# Patient Record
Sex: FEMALE | Race: WHITE | NOT HISPANIC OR LATINO | Employment: OTHER | URBAN - NONMETROPOLITAN AREA
[De-identification: names, ages, dates, MRNs, and addresses within clinical notes are randomized per-mention and may not be internally consistent; named-entity substitution may affect disease eponyms.]

---

## 2017-01-05 ENCOUNTER — CONSULT (OUTPATIENT)
Dept: CARDIOLOGY | Facility: CLINIC | Age: 32
End: 2017-01-05

## 2017-01-05 VITALS
HEIGHT: 62 IN | BODY MASS INDEX: 37.54 KG/M2 | DIASTOLIC BLOOD PRESSURE: 68 MMHG | HEART RATE: 76 BPM | SYSTOLIC BLOOD PRESSURE: 104 MMHG | WEIGHT: 204 LBS

## 2017-01-05 DIAGNOSIS — E56.9 VITAMIN DEFICIENCY: ICD-10-CM

## 2017-01-05 DIAGNOSIS — E78.00 HYPERCHOLESTEREMIA: ICD-10-CM

## 2017-01-05 DIAGNOSIS — R00.2 PALPITATIONS: ICD-10-CM

## 2017-01-05 DIAGNOSIS — R01.1 MURMUR, CARDIAC: ICD-10-CM

## 2017-01-05 DIAGNOSIS — R53.82 CHRONIC FATIGUE: ICD-10-CM

## 2017-01-05 DIAGNOSIS — R60.0 BILATERAL EDEMA OF LOWER EXTREMITY: ICD-10-CM

## 2017-01-05 DIAGNOSIS — R06.02 SHORTNESS OF BREATH: ICD-10-CM

## 2017-01-05 DIAGNOSIS — R07.89 OTHER CHEST PAIN: Primary | ICD-10-CM

## 2017-01-05 PROCEDURE — 99204 OFFICE O/P NEW MOD 45 MIN: CPT | Performed by: INTERNAL MEDICINE

## 2017-01-05 RX ORDER — FLUDROCORTISONE ACETATE 0.1 MG/1
0.1 TABLET ORAL DAILY
Qty: 30 TABLET | Refills: 8 | Status: SHIPPED | OUTPATIENT
Start: 2017-01-05 | End: 2017-07-25

## 2017-01-05 RX ORDER — ONDANSETRON 4 MG/1
4 TABLET, FILM COATED ORAL EVERY 8 HOURS PRN
COMMUNITY

## 2017-01-05 NOTE — PROGRESS NOTES
CARDIAC COMPLAINTS  chest pressure/discomfort, dyspnea and palpitations      Subjective   Kendra Freitas is a 31 y.o. female came in today for her initial cardiac evaluation.  She has history of palpitation on and off for many years.  She is seen by another cardiologist and also an electrophysiologist.  Apparently the tried multiple different medications, which she did not take regularly.  She did develop some side effects from the medications.  She now wants to be seen here.  The palpitation occurs mostly at rest, but sometime on exertion.  She also gets chest pain which sometime associated with the palpitation that most of the time without it.  She also has been having increasing shortness of breath on minimal exertion.  She also has bilateral leg edema which is progressively got worse.  She apparently underwent some investigation about a year or 2 ago and was told everything was normal.  She is not sure when was the last time she had any lab work done.    History reviewed. No pertinent past surgical history.    Current Outpatient Prescriptions   Medication Sig Dispense Refill   • clonazePAM (KlonoPIN) 0.5 MG tablet Take 0.5 mg by mouth 2 (Two) Times a Day As Needed for seizures.     • mometasone-formoterol (DULERA 200) 200-5 MCG/ACT inhaler Inhale 2 puffs 2 (Two) Times a Day.     • ondansetron (ZOFRAN) 4 MG tablet Take 4 mg by mouth Every 8 (Eight) Hours As Needed for nausea or vomiting.     • risperiDONE (RisperDAL) 1 MG tablet Take 1 mg by mouth Daily.     • fludrocortisone 0.1 MG tablet Take 1 tablet by mouth Daily. 30 tablet 8   • metoprolol tartrate (LOPRESSOR) 25 MG tablet Take 1 tablet by mouth 2 (Two) Times a Day. 60 tablet 8     No current facility-administered medications for this visit.            ALLERGIES:  Amoxicillin; Contrast dye; Ibuprofen; Phenergan [promethazine hcl]; and Prenatal complete    Past Medical History   Diagnosis Date   • Asthma    • H/O  section    • History of  "appendectomy    • History of ear surgery      right   • Seizure      Pt states only related to taking ibuprofen       History   Smoking Status   • Former Smoker   • Quit date: 2008   Smokeless Tobacco   • Current User   • Types: Snuff        Review of Systems   Constitutional: Positive for fatigue and unexpected weight change. Negative for activity change.   HENT: Negative for congestion and sneezing.    Eyes: Negative for discharge.   Respiratory: Positive for chest tightness and shortness of breath.    Cardiovascular: Positive for chest pain, palpitations and leg swelling.   Gastrointestinal: Negative for abdominal distention and nausea.   Endocrine: Negative for cold intolerance.   Genitourinary: Negative for difficulty urinating.   Musculoskeletal: Positive for arthralgias, back pain and myalgias.   Neurological: Positive for dizziness and numbness.   Hematological: Negative for adenopathy.   Psychiatric/Behavioral: Negative for agitation.       Diabetes- No  Thyroid- normal    Objective     Visit Vitals   • /68 (BP Location: Left arm)   • Pulse 76  Comment: regular   • Ht 62\" (157.5 cm)   • Wt 204 lb (92.5 kg)   • BMI 37.31 kg/m2       Physical Exam   Constitutional: She appears well-developed.   HENT:   Head: Normocephalic.   Eyes: Pupils are equal, round, and reactive to light.   Neck: Normal range of motion.   Cardiovascular: Normal rate and regular rhythm.    Murmur heard.  Pulmonary/Chest: Effort normal.   Abdominal: Soft.   Musculoskeletal: Normal range of motion.   Neurological: She is alert.   Skin: Skin is warm.   Psychiatric: She has a normal mood and affect.       Procedures      Assessment/Plan   At baseline her heart rate and blood pressure is stable.  Her BMI is elevated at 37.  She does have bilateral edema, appears to be more lymphatic then vascular.  I advised her to have lab work done to check a BNP, electrolytes, renal function, lipids and TSH.  I scheduled her to undergo an " echocardiogram to evaluate the LV function, valvular structures.  I also scheduled her to undergo a stress test to rule out ischemia causing the chest pain.  Since she is not able to walk, it is to be done as a Lexiscan.  Her EKG which was done about a month ago, did not show any acute changes.  At this time I started her on low-dose of beta blockers, and also 0.1 mg of Florinef once a day.  Based on the results of the tests, and her response to her medications further recommendations will be made.  Kendra was seen today for establish care, medication, palpitations and records.    Diagnoses and all orders for this visit:    Other chest pain  -     CBC & Differential; Future  -     High Sensitivity CRP; Future  -     Stress Test With Myocardial Perfusion One Day; Future    Shortness of breath  -     Comprehensive Metabolic Panel; Future  -     Adult Transthoracic Echo Complete; Future    Palpitations  -     TSH; Future    Bilateral edema of lower extremity  -     proBNP; Future    Chronic fatigue    Murmur, cardiac  -     Adult Transthoracic Echo Complete; Future    Vitamin deficiency  -     Vitamin B12 & Folate; Future  -     Vitamin D 1,25 Dihydroxy; Future    Hypercholesteremia  -     Lipid Panel; Future  -     Stress Test With Myocardial Perfusion One Day; Future    Other orders  -     fludrocortisone 0.1 MG tablet; Take 1 tablet by mouth Daily.  -     metoprolol tartrate (LOPRESSOR) 25 MG tablet; Take 1 tablet by mouth 2 (Two) Times a Day.                    Electronically signed by Mino Barrett MD January 5, 2017 1:44 PM

## 2017-01-05 NOTE — MR AVS SNAPSHOT
[facility] CARDIOLOGY  [phone]                    Kendra Freitas   1/5/2017 10:45 AM   Consult    Dept Phone:  [phone]   Encounter #:  [id number]    Provider:  Mino Barrett MD   Department:  [facility] CARDIOLOGY                Your Full Care Plan              Today's Medication Changes          These changes are accurate as of: 1/5/17 11:36 AM.  If you have any questions, ask your nurse or doctor.               New Medication(s)Ordered:     fludrocortisone 0.1 MG tablet   Take 1 tablet by mouth Daily.   Started by:  Mino Barrett MD       metoprolol tartrate 25 MG tablet   Commonly known as:  LOPRESSOR   Take 1 tablet by mouth 2 (Two) Times a Day.   Started by:  Mino Barrett MD            Where to Get Your Medications      These medications were sent to [email or web address] [place], [place] - [address] [facility] [phone] [facility] [phone] [address] [address]     Phone:  [phone]     fludrocortisone 0.1 MG tablet    metoprolol tartrate 25 MG tablet                  Your Updated Medication List          This list is accurate as of: 1/5/17 11:36 AM.  Always use your most recent med list.                clonazePAM 0.5 MG tablet   Commonly known as:  KlonoPIN       fludrocortisone 0.1 MG tablet   Take 1 tablet by mouth Daily.       metoprolol tartrate 25 MG tablet   Commonly known as:  LOPRESSOR   Take 1 tablet by mouth 2 (Two) Times a Day.       mometasone-formoterol 200-5 MCG/ACT inhaler   Commonly known as:  DULERA 200       ondansetron 4 MG tablet   Commonly known as:  ZOFRAN       risperiDONE 1 MG tablet   Commonly known as:  risperDAL               You Were Diagnosed With        Codes Comments    Other chest pain    -  Primary ICD-10-CM: R07.89  ICD-9-CM: 786.59     Shortness of breath     ICD-10-CM: R06.02  ICD-9-CM: 786.05     Palpitations     ICD-10-CM: R00.2  ICD-9-CM: 785.1     Bilateral edema of lower extremity      ICD-10-CM: R60.0  ICD-9-CM: 782.3     Chronic fatigue     ICD-10-CM: R53.82  ICD-9-CM: 780.79     Murmur, cardiac     ICD-10-CM: R01.1  ICD-9-CM: 785.2     Vitamin deficiency     ICD-10-CM: E56.9  ICD-9-CM: 269.2     Hypercholesteremia     ICD-10-CM: E78.00  ICD-9-CM: 272.0       Instructions     None    Patient Instructions History      Upcoming Appointments     Visit Type Date Time Department    CONSULT 2017 10:45 AM [facility]    FOLLOW UP 2017  9:00 AM [facility]      [employer] Signup     [facility] [employer] allows you to send messages to your doctor, view your test results, renew your prescriptions, schedule appointments, and more. To sign up, go to [employer] and click on the Sign Up Now link in the New User? box. Enter your [employer] Activation Code exactly as it appears below along with the last four digits of your Social Security Number and your Date of Birth () to complete the sign-up process. If you do not sign up before the expiration date, you must request a new code.    [employer] Activation Code: [email or web address]  Expires: 2017 11:34 AM    If you have questions, you can email [email or web address] or call [phone] to talk to our [employer] staff. Remember, [employer] is NOT to be used for urgent needs. For medical emergencies, dial 911.               Other Info from Your Visit           Your Appointments     2017  9:00 AM EDT   Follow Up with NALINI Andersen   [facility] (--)    [address] [place] [address] [phone]           Arrive 15 minutes prior to appointment.              Allergies     Amoxicillin      Contrast Dye      Ibuprofen      Phenergan [Promethazine Hcl]      Prenatal Complete        Reason for Visit     Establish Care  Transferring care from Dr Tate.( last saw him 2 months ago) History of palpitations, edema in lower extremities.  Still having problems.  Stress and Echo about  "a year ago at Dr Tate's office.     Medication Pt states she has been on numerous meds before but stopped all meds but the 3 listed     Palpitations Was referred to Dr El in Oct for palpitations. His note is in here with EKG.     Records Extended moniter report and strips under media tab, ref. records.       Vital Signs     Blood Pressure Pulse Height Weight Body Mass Index Smoking Status    104/68 (BP Location: Left arm) 76 62\" (157.5 cm) 204 lb (92.5 kg) 37.31 kg/m2 Former Smoker      Problems and Diagnoses Noted     Tiredness    Chest pain    -  Primary    Shortness of breath        Palpitations        Edema of both legs        Murmur, cardiac        Vitamin deficiency        High cholesterol            "

## 2017-01-05 NOTE — LETTER
January 5, 2017     Jamia Samaniego MD  [address]  [facility] [address]  [facility] [address]    Patient: Kendra Freitas   YOB: 1985   Date of Visit: 1/5/2017       Dear Dr. Aden MD:    Thank you for referring Kendra Freitas to me for evaluation. Below are the relevant portions of my assessment and plan of care.       Subjective   Kendra Freitas is a 31 y.o. female came in today for her initial cardiac evaluation.  She has history of palpitation on and off for many years.  She is seen by another cardiologist and also an electrophysiologist.  Apparently the tried multiple different medications, which she did not take regularly.  She did develop some side effects from the medications.  She now wants to be seen here.  The palpitation occurs mostly at rest, but sometime on exertion.  She also gets chest pain which sometime associated with the palpitation that most of the time without it.  She also has been having increasing shortness of breath on minimal exertion.  She also has bilateral leg edema which is progressively got worse.  She apparently underwent some investigation about a year or 2 ago and was told everything was normal.  She is not sure when was the last time she had any lab work done.    History reviewed. No pertinent past surgical history.    Current Outpatient Prescriptions   Medication Sig Dispense Refill   • clonazePAM (KlonoPIN) 0.5 MG tablet Take 0.5 mg by mouth 2 (Two) Times a Day As Needed for seizures.     • mometasone-formoterol (DULERA 200) 200-5 MCG/ACT inhaler Inhale 2 puffs 2 (Two) Times a Day.     • ondansetron (ZOFRAN) 4 MG tablet Take 4 mg by mouth Every 8 (Eight) Hours As Needed for nausea or vomiting.     • risperiDONE (RisperDAL) 1 MG tablet Take 1 mg by mouth Daily.     • fludrocortisone 0.1 MG tablet Take 1 tablet by mouth Daily. 30 tablet 8   • metoprolol tartrate (LOPRESSOR) 25 MG tablet Take 1 tablet by mouth 2 (Two) Times a Day. 60 tablet 8     No current  facility-administered medications for this visit.                Assessment/Plan   At baseline her heart rate and blood pressure is stable.  Her BMI is elevated at 37.  She does have bilateral edema, appears to be more lymphatic then vascular.  I advised her to have lab work done to check a BNP, electrolytes, renal function, lipids and TSH.  I scheduled her to undergo an echocardiogram to evaluate the LV function, valvular structures.  I also scheduled her to undergo a stress test to rule out ischemia causing the chest pain.  Since she is not able to walk, it is to be done as a Lexiscan.  Her EKG which was done about a month ago, did not show any acute changes.  At this time I started her on low-dose of beta blockers, and also 0.1 mg of Florinef once a day.  Based on the results of the tests, and her response to her medications further recommendations will be made.  Kendra was seen today for establish care, medication, palpitations and records.    Diagnoses and all orders for this visit:    Other chest pain  -     CBC & Differential; Future  -     High Sensitivity CRP; Future  -     Stress Test With Myocardial Perfusion One Day; Future    Shortness of breath  -     Comprehensive Metabolic Panel; Future  -     Adult Transthoracic Echo Complete; Future    Palpitations  -     TSH; Future    Bilateral edema of lower extremity  -     proBNP; Future    Chronic fatigue    Murmur, cardiac  -     Adult Transthoracic Echo Complete; Future    Vitamin deficiency  -     Vitamin B12 & Folate; Future  -     Vitamin D 1,25 Dihydroxy; Future    Hypercholesteremia  -     Lipid Panel; Future  -     Stress Test With Myocardial Perfusion One Day; Future    Other orders  -     fludrocortisone 0.1 MG tablet; Take 1 tablet by mouth Daily.  -     metoprolol tartrate (LOPRESSOR) 25 MG tablet; Take 1 tablet by mouth 2 (Two) Times a Day.               If you have questions, please do not hesitate to call me. I look forward to following Kendra  along with you.         Sincerely,        Mino Barrett MD        CC: No Recipients

## 2017-01-12 ENCOUNTER — OUTSIDE FACILITY SERVICE (OUTPATIENT)
Dept: CARDIOLOGY | Facility: CLINIC | Age: 32
End: 2017-01-12

## 2017-01-12 ENCOUNTER — HOSPITAL ENCOUNTER (OUTPATIENT)
Dept: CARDIOLOGY | Facility: HOSPITAL | Age: 32
Discharge: HOME | End: 2017-01-12
Attending: INTERNAL MEDICINE

## 2017-01-12 DIAGNOSIS — E78.00 HYPERCHOLESTEREMIA: ICD-10-CM

## 2017-01-12 DIAGNOSIS — R06.02 SHORTNESS OF BREATH: ICD-10-CM

## 2017-01-12 DIAGNOSIS — R07.89 OTHER CHEST PAIN: ICD-10-CM

## 2017-01-12 DIAGNOSIS — R01.1 MURMUR, CARDIAC: ICD-10-CM

## 2017-01-12 LAB
MAXIMAL PREDICTED HEART RATE: 189 BPM
STRESS TARGET HR: 161 BPM

## 2017-01-12 PROCEDURE — 78452 HT MUSCLE IMAGE SPECT MULT: CPT | Performed by: INTERNAL MEDICINE

## 2017-01-12 PROCEDURE — 25010000002 REGADENOSON 0.4 MG/5ML SOLUTION: Performed by: INTERNAL MEDICINE

## 2017-01-12 PROCEDURE — 93017 CV STRESS TEST TRACING ONLY: CPT

## 2017-01-12 PROCEDURE — 0 TECHNETIUM SESTAMIBI: Performed by: INTERNAL MEDICINE

## 2017-01-12 PROCEDURE — 93306 TTE W/DOPPLER COMPLETE: CPT

## 2017-01-12 PROCEDURE — 93306 TTE W/DOPPLER COMPLETE: CPT | Performed by: INTERNAL MEDICINE

## 2017-01-12 PROCEDURE — 93018 CV STRESS TEST I&R ONLY: CPT | Performed by: INTERNAL MEDICINE

## 2017-01-12 PROCEDURE — 78452 HT MUSCLE IMAGE SPECT MULT: CPT

## 2017-01-12 PROCEDURE — A9500 TC99M SESTAMIBI: HCPCS | Performed by: INTERNAL MEDICINE

## 2017-01-12 RX ADMIN — Medication 1 DOSE: at 09:30

## 2017-01-12 RX ADMIN — REGADENOSON 0.4 MG: 0.08 INJECTION, SOLUTION INTRAVENOUS at 09:30

## 2017-01-17 ENCOUNTER — TELEPHONE (OUTPATIENT)
Dept: CARDIOLOGY | Facility: CLINIC | Age: 32
End: 2017-01-17

## 2017-01-18 ENCOUNTER — TELEPHONE (OUTPATIENT)
Dept: CARDIOLOGY | Facility: CLINIC | Age: 32
End: 2017-01-18

## 2017-01-18 NOTE — TELEPHONE ENCOUNTER
Lab from [facility] called, no codes to pass Vitamin D, Vitamin B12 or  Folate. These tests were cancelled.  Do you want to reorder and add new diagnosis to cover?

## 2017-07-25 ENCOUNTER — OFFICE VISIT (OUTPATIENT)
Dept: CARDIOLOGY | Facility: CLINIC | Age: 32
End: 2017-07-25

## 2017-07-25 VITALS
WEIGHT: 200 LBS | HEIGHT: 62 IN | BODY MASS INDEX: 36.8 KG/M2 | SYSTOLIC BLOOD PRESSURE: 102 MMHG | HEART RATE: 96 BPM | DIASTOLIC BLOOD PRESSURE: 64 MMHG

## 2017-07-25 DIAGNOSIS — R00.0 SINUS TACHYCARDIA: ICD-10-CM

## 2017-07-25 DIAGNOSIS — R00.2 PALPITATIONS: Primary | ICD-10-CM

## 2017-07-25 DIAGNOSIS — F12.90 MARIJUANA SMOKER: ICD-10-CM

## 2017-07-25 DIAGNOSIS — F17.298 OTHER TOBACCO PRODUCT NICOTINE DEPENDENCE WITH OTHER NICOTINE-INDUCED DISORDER: ICD-10-CM

## 2017-07-25 PROCEDURE — 99213 OFFICE O/P EST LOW 20 MIN: CPT | Performed by: NURSE PRACTITIONER

## 2017-07-25 RX ORDER — FUROSEMIDE 20 MG/1
20 TABLET ORAL DAILY
COMMUNITY
End: 2019-02-14 | Stop reason: DRUGHIGH

## 2017-07-25 RX ORDER — ASPIRIN 81 MG/1
81 TABLET ORAL DAILY
COMMUNITY
End: 2021-03-24 | Stop reason: SDUPTHER

## 2017-07-25 NOTE — PROGRESS NOTES
"Chief Complaint   Patient presents with   • Follow-up     PCP refills meds. Can't remember last time labs were checked.    • Chest Pain     Off and on. Says it is about the same as before.    • Shortness of Breath     About the same as before.    • Palpitations     About the same as before.        Orville Freitas is a 32 y.o. female with a history of palpitation on and off for many years.  She has been seen by another cardiologist and also an electrophysiologist.  Apparently multiple medications were tried, which she admits she did not take regularly due to some kind of side effect.  IN January 2017, she was referred to Dr. Barrett for evaluation of her palpitations. At her consultation Lopressor and fludrocortisone were recommended. She then underwent stress and echo that were negative for ischemia or arrhythmia.    Today she comes to the office for a follow up visit and continues to have same symptoms. She states she was not able to take Lopressor or fludrocortisone due to making her feel bad. She states if she smokes \"a little weed\" at night, it helps her palpitations more than anything.     HPI         Cardiac History:    Past Surgical History:   Procedure Laterality Date   • CARDIOVASCULAR STRESS TEST  01/12/2017    Lexiscan- no evidence of ischemia   • ECHO - CONVERTED  01/12/2017    EF 50-55%, mild MR, RVSP 23 mmHg       Current Outpatient Prescriptions   Medication Sig Dispense Refill   • aspirin 81 MG EC tablet Take 81 mg by mouth Daily.     • Fluticasone Furoate (ARNUITY ELLIPTA IN) Inhale.     • furosemide (LASIX) 20 MG tablet Take 20 mg by mouth Daily.     • Indacaterol-Glycopyrrolate (UTIBRON NEOHALER IN) Inhale.     • ondansetron (ZOFRAN) 4 MG tablet Take 4 mg by mouth Every 8 (Eight) Hours As Needed for nausea or vomiting.     • rotigotine (NEUPRO) 1 MG/24HR patch 24 hour 24 hour patch Place 1 patch on the skin Daily.       No current facility-administered medications for this visit.  "       Amoxicillin; Contrast dye; Ibuprofen; Phenergan [promethazine hcl]; and Prenatal complete    Past Medical History:   Diagnosis Date   • Asthma    • H/O  section    • History of appendectomy    • History of ear surgery     right   • Seizure     Pt states only related to taking ibuprofen       Social History     Social History   • Marital status: Unknown     Spouse name: N/A   • Number of children: N/A   • Years of education: N/A     Occupational History   • Not on file.     Social History Main Topics   • Smoking status: Former Smoker     Quit date:    • Smokeless tobacco: Current User     Types: Snuff   • Alcohol use No   • Drug use: No   • Sexual activity: Defer     Other Topics Concern   • Not on file     Social History Narrative       Family History   Problem Relation Age of Onset   • Heart disease Father    • Hypertension Father    • COPD Father    • Diabetes Father    • Alzheimer's disease Father        Review of Systems   Constitution: Positive for malaise/fatigue. Negative for decreased appetite.   HENT: Negative for congestion and sore throat.    Eyes: Negative for blurred vision.   Cardiovascular: Positive for chest pain, leg swelling (reports) and palpitations.   Respiratory: Positive for shortness of breath. Negative for sleep disturbances due to breathing and snoring.    Endocrine: Negative for cold intolerance and heat intolerance.   Hematologic/Lymphatic: Negative for adenopathy. Does not bruise/bleed easily.   Skin: Negative for itching, nail changes and skin cancer.   Musculoskeletal: Positive for joint pain (knees). Negative for arthritis and myalgias.   Gastrointestinal: Positive for heartburn (at times). Negative for abdominal pain and dysphagia.   Genitourinary: Negative for dysuria and frequency.   Neurological: Negative for dizziness, light-headedness, seizures and vertigo.   Psychiatric/Behavioral: Negative for altered mental status. The patient is nervous/anxious.   "  Allergic/Immunologic: Negative for environmental allergies and hives.        Diabetes- No  Thyroid-normal    Objective     /64  Pulse 96  Ht 62\" (157.5 cm)  Wt 200 lb (90.7 kg)  BMI 36.58 kg/m2    Physical Exam   Constitutional: She is oriented to person, place, and time. She appears well-nourished.   HENT:   Head: Normocephalic.   Eyes: Pupils are equal, round, and reactive to light.   Neck: Normal range of motion. Carotid bruit is not present. No edema present.   Cardiovascular: Normal rate, regular rhythm, S1 normal and S2 normal.    No murmur heard.  Pulses:       Radial pulses are 2+ on the right side, and 2+ on the left side.   Pulmonary/Chest: Breath sounds normal.   Abdominal: Soft. Bowel sounds are normal.   Musculoskeletal: Normal range of motion.   Neurological: She is alert and oriented to person, place, and time.   Skin: Skin is warm.   Psychiatric: She has a normal mood and affect.      Procedures        Assessment/Plan      Kendra was seen today for follow-up, chest pain, shortness of breath and palpitations.    Diagnoses and all orders for this visit:    Palpitations    Sinus tachycardia    Other tobacco product nicotine dependence with other nicotine-induced disorder    Marijuana smoker      The reports of her most recent stress and echocardiogram were reviewed. No ischemia or arrhythmia noted. Her blood pressure and heart rate are stable. I advised her to avoid caffeine. I did not make any medication changes today given patient has not been able to tolerate prescription medications recommended. She wants to continue conservative management and asked if smoking marijuana could help her heart. I advised her it is not used to treat cardiac disease and I recommend she avoid due to lung issues for which she follows with pulmonologist. I also encouraged her to avoid using tobacco in form of dip. No further cardiac testing ordered at this time. A 6 month follow up scheduled.        "     Electronically signed by NALINI Glass,  July 27, 2017 4:52 PM

## 2018-01-29 ENCOUNTER — OFFICE VISIT (OUTPATIENT)
Dept: CARDIOLOGY | Facility: CLINIC | Age: 33
End: 2018-01-29

## 2018-01-29 VITALS
HEIGHT: 62 IN | HEART RATE: 80 BPM | DIASTOLIC BLOOD PRESSURE: 72 MMHG | WEIGHT: 195 LBS | SYSTOLIC BLOOD PRESSURE: 110 MMHG | BODY MASS INDEX: 35.88 KG/M2

## 2018-01-29 DIAGNOSIS — I38 VHD (VALVULAR HEART DISEASE): ICD-10-CM

## 2018-01-29 DIAGNOSIS — R06.02 SHORTNESS OF BREATH: ICD-10-CM

## 2018-01-29 DIAGNOSIS — J45.20 MILD INTERMITTENT ASTHMA WITHOUT COMPLICATION: ICD-10-CM

## 2018-01-29 DIAGNOSIS — R00.0 SINUS TACHYCARDIA: Primary | ICD-10-CM

## 2018-01-29 DIAGNOSIS — Z72.0 SNUFF USER: ICD-10-CM

## 2018-01-29 DIAGNOSIS — R00.2 PALPITATIONS: ICD-10-CM

## 2018-01-29 DIAGNOSIS — F41.9 ANXIETY: ICD-10-CM

## 2018-01-29 DIAGNOSIS — R60.0 LOCALIZED EDEMA: ICD-10-CM

## 2018-01-29 PROCEDURE — 99213 OFFICE O/P EST LOW 20 MIN: CPT | Performed by: NURSE PRACTITIONER

## 2018-01-29 NOTE — PROGRESS NOTES
Chief Complaint   Patient presents with   • Follow-up     For cardiac management. Reports having a sharp chest pain earlier today. Reports having shortness of breath, relates to asthma, reports it has got worse. Reports she has palpitations occasionally. Last lab work done on 01/18/17 per PCP, in chart. Reports that PCP lasix is now 40 mg QD.    • Med Refill     PCP does medication refills. Did not bring medication list, went over verbally.        Cardiac Complaints  palpitations      Subjective   Kendra Freitas is a 32 y.o. female with a history of palpitations on and off for many years.  She has been seen by another cardiologist and also an electrophysiologist.  Apparently multiple medications were tried, which she admits she did not take regularly due to some kind of side effect.  In January 2017, she was referred to Dr. Barrett for evaluation of her palpitations. At her consultation Lopressor and fludrocortisone were recommended. She then underwent stress and echo that were negative for ischemia or arrhythmia. She later stopped taking both medications due to side effects but continued to use marijuana routinely to control her palpitations.  She returns today for follow up and continues to have sharp chest pain and shortness of breath which she attributes to her lung problems. She continues to have palpitations but no worse than prior. She is seeing an allergist in regards but reports she has not been able to get in with her medical card. Labs are done with PCP, no recent copy is available from me.      Cardiac History  Past Surgical History:   Procedure Laterality Date   • CARDIOVASCULAR STRESS TEST  01/12/2017    Lexiscan- no evidence of ischemia   • ECHO - CONVERTED  01/12/2017    EF 50-55%, mild MR, RVSP 23 mmHg       Current Outpatient Prescriptions   Medication Sig Dispense Refill   • aspirin 81 MG EC tablet Take 81 mg by mouth Daily.     • Fluticasone Furoate (ARNUITY ELLIPTA IN) Inhale.     • ondansetron  (ZOFRAN) 4 MG tablet Take 4 mg by mouth Every 8 (Eight) Hours As Needed for nausea or vomiting.     • rotigotine (NEUPRO) 1 MG/24HR patch 24 hour 24 hour patch Place 1 patch on the skin Daily.     • furosemide (LASIX) 20 MG tablet Take 20 mg by mouth Daily.       No current facility-administered medications for this visit.        Amoxicillin; Contrast dye; Garlic; Ibuprofen; Phenergan [promethazine hcl]; and Prenatal complete    Past Medical History:   Diagnosis Date   • Asthma    • H/O  section    • History of appendectomy    • History of ear surgery     right   • Seizure     Pt states only related to taking ibuprofen       Social History     Social History   • Marital status: Unknown     Spouse name: N/A   • Number of children: N/A   • Years of education: N/A     Occupational History   • Not on file.     Social History Main Topics   • Smoking status: Former Smoker     Quit date:    • Smokeless tobacco: Current User     Types: Snuff   • Alcohol use No   • Drug use: No   • Sexual activity: Defer     Other Topics Concern   • Not on file     Social History Narrative       Family History   Problem Relation Age of Onset   • Heart disease Father    • Hypertension Father    • COPD Father    • Diabetes Father    • Alzheimer's disease Father        Review of Systems   Constitution: Negative for weakness and malaise/fatigue.   Cardiovascular: Positive for palpitations. Negative for chest pain, dyspnea on exertion, irregular heartbeat, near-syncope and syncope.   Respiratory: Positive for shortness of breath. Negative for wheezing.    Musculoskeletal: Negative for back pain and joint pain.   Gastrointestinal: Negative for anorexia, heartburn and nausea.   Genitourinary: Negative for dysuria, hesitancy and nocturia.   Neurological: Negative for dizziness, light-headedness and loss of balance.   Psychiatric/Behavioral: Negative for depression and memory loss.       DiabetesNo  Thyroidnormal    Objective     BP  "110/72 (BP Location: Left arm)  Pulse 80  Ht 157.5 cm (62.01\")  Wt 88.5 kg (195 lb)  BMI 35.66 kg/m2    Physical Exam   Constitutional: She is oriented to person, place, and time. She appears well-developed and well-nourished.   HENT:   Head: Normocephalic and atraumatic.   Eyes: EOM are normal. Pupils are equal, round, and reactive to light.   Neck: Normal range of motion. Neck supple.   Cardiovascular: Normal rate and regular rhythm.    Murmur heard.  Pulmonary/Chest: Effort normal and breath sounds normal.   Abdominal: Soft.   Musculoskeletal: Normal range of motion.   Neurological: She is alert and oriented to person, place, and time.   Skin: Skin is warm and dry.   Psychiatric: She has a normal mood and affect. Her behavior is normal.       Procedures    Assessment/Plan     HR and BP are stable today.  No changes to current regimen will be advised.  No new cardiac workup will be encouraged today as no new concerns voiced.  Labs are done with your office, she states she has not had any for quite some time.  She was encouraged to get back in with your office for more labs since medication is filled with your office as well.  She is now on lasix therapy for edema in BLE, but reports she has not had potassium or creatinine checked.  She continues to report palpitations and shortness of breath but reports no worse than prior, and is no longer using her marijuana to aid in palpitation management.  She continues to follow with allergist for shortness of breath but has not been able to follow with them for sometime due to insurance coverage.  Unfortunately, she still continues to dip despite concerns.  We discussed tobacco cessation once again.  She reports also following with Dr. Maria for cardiac management and states she has an appointment with him next month.  Patient states seeing two specialist for almost every condition she has, which is not safest practice, as both specialist are trying to manage same " conditions.  FU will be extended to yearly as testing has been negative, cardiac status appears stable, and labs and refills are managed with your office.  If any worsening symptoms should develop, patient advised to call for sooner appointment.      Problems Addressed this Visit        Cardiovascular and Mediastinum    Sinus tachycardia - Primary    VHD (valvular heart disease)       Other    Anxiety      Other Visit Diagnoses     Palpitations        Mild intermittent asthma without complication        Localized edema        Snuff user        Shortness of breath                      Electronically signed by NALINI Erickson January 29, 2018 4:30 PM

## 2018-04-16 NOTE — TELEPHONE ENCOUNTER
Patient aware of stress test and echo results and recommendations.  I spoke with patient's father, Malachi Chaney.  Continue home medications.  Aware if palpitations to call the office per stress test can increase Lopressor to 50 mg BID if palpitations persist.   Myomas

## 2019-02-14 ENCOUNTER — OFFICE VISIT (OUTPATIENT)
Dept: CARDIOLOGY | Facility: CLINIC | Age: 34
End: 2019-02-14

## 2019-02-14 VITALS
BODY MASS INDEX: 37.17 KG/M2 | WEIGHT: 202 LBS | HEART RATE: 76 BPM | SYSTOLIC BLOOD PRESSURE: 110 MMHG | HEIGHT: 62 IN | DIASTOLIC BLOOD PRESSURE: 82 MMHG

## 2019-02-14 DIAGNOSIS — F19.11 HISTORY OF SUBSTANCE ABUSE (HCC): ICD-10-CM

## 2019-02-14 DIAGNOSIS — R07.89 CHEST PAIN, ATYPICAL: ICD-10-CM

## 2019-02-14 DIAGNOSIS — R00.2 PALPITATIONS: Primary | ICD-10-CM

## 2019-02-14 DIAGNOSIS — F17.298 OTHER TOBACCO PRODUCT NICOTINE DEPENDENCE WITH OTHER NICOTINE-INDUCED DISORDER: ICD-10-CM

## 2019-02-14 DIAGNOSIS — R60.0 LOCALIZED EDEMA: ICD-10-CM

## 2019-02-14 DIAGNOSIS — J45.20 MILD INTERMITTENT ASTHMA, UNSPECIFIED WHETHER COMPLICATED: ICD-10-CM

## 2019-02-14 DIAGNOSIS — R06.02 SHORTNESS OF BREATH: ICD-10-CM

## 2019-02-14 DIAGNOSIS — F41.9 ANXIETY: ICD-10-CM

## 2019-02-14 DIAGNOSIS — E66.9 OBESITY (BMI 30-39.9): ICD-10-CM

## 2019-02-14 PROCEDURE — 99213 OFFICE O/P EST LOW 20 MIN: CPT | Performed by: NURSE PRACTITIONER

## 2019-02-14 RX ORDER — CETIRIZINE HYDROCHLORIDE 10 MG/1
10 TABLET ORAL DAILY
COMMUNITY

## 2019-02-14 RX ORDER — BUMETANIDE 1 MG/1
1 TABLET ORAL DAILY
COMMUNITY
End: 2020-06-26 | Stop reason: SDUPTHER

## 2019-02-14 RX ORDER — ALBUTEROL SULFATE 90 UG/1
2 AEROSOL, METERED RESPIRATORY (INHALATION) EVERY 4 HOURS PRN
COMMUNITY
End: 2019-12-23 | Stop reason: ALTCHOICE

## 2019-02-14 RX ORDER — QUETIAPINE FUMARATE 50 MG/1
50 TABLET, FILM COATED ORAL NIGHTLY
COMMUNITY
End: 2019-12-23 | Stop reason: DRUGHIGH

## 2019-02-14 RX ORDER — FUROSEMIDE 40 MG/1
40 TABLET ORAL 2 TIMES DAILY
COMMUNITY
End: 2020-06-26 | Stop reason: SDUPTHER

## 2019-02-14 RX ORDER — NITROFURANTOIN MACROCRYSTALS 100 MG/1
100 CAPSULE ORAL 4 TIMES DAILY
COMMUNITY
End: 2019-05-30 | Stop reason: ALTCHOICE

## 2019-02-14 RX ORDER — MONTELUKAST SODIUM 10 MG/1
10 TABLET ORAL DAILY
COMMUNITY

## 2019-02-14 RX ORDER — OMEPRAZOLE 20 MG/1
20 CAPSULE, DELAYED RELEASE ORAL DAILY
COMMUNITY

## 2019-02-14 RX ORDER — LACTULOSE 10 G/15ML
20 SOLUTION ORAL DAILY
COMMUNITY
End: 2019-05-30 | Stop reason: ALTCHOICE

## 2019-02-14 RX ORDER — BUSPIRONE HYDROCHLORIDE 5 MG/1
5 TABLET ORAL 3 TIMES DAILY
COMMUNITY
End: 2019-05-30 | Stop reason: ALTCHOICE

## 2019-02-14 RX ORDER — CLONAZEPAM 0.5 MG/1
0.5 TABLET ORAL 2 TIMES DAILY
COMMUNITY
End: 2019-12-23 | Stop reason: ALTCHOICE

## 2019-02-14 RX ORDER — DULOXETIN HYDROCHLORIDE 60 MG/1
60 CAPSULE, DELAYED RELEASE ORAL DAILY
COMMUNITY

## 2019-02-14 RX ORDER — SPIRONOLACTONE 25 MG/1
25 TABLET ORAL DAILY
COMMUNITY
End: 2020-06-26 | Stop reason: SDUPTHER

## 2019-02-14 RX ORDER — PHENAZOPYRIDINE HYDROCHLORIDE 200 MG/1
200 TABLET, FILM COATED ORAL 3 TIMES DAILY
COMMUNITY
End: 2019-05-30 | Stop reason: ALTCHOICE

## 2019-02-14 RX ORDER — PREDNISONE 20 MG/1
20 TABLET ORAL DAILY
COMMUNITY
End: 2019-05-30 | Stop reason: ALTCHOICE

## 2019-02-14 NOTE — PROGRESS NOTES
"Chief Complaint   Patient presents with   • Follow-up     Cardiac management. Last labs per Dr Pinto. She stopped using Meth 3 months ago.   • Rapid Heart Rate     She states \"my heart is still racing, HR has been 123\".   • Chest Pain     Having some sharp pain to mid chest, short in duration.   • Shortness of Breath     She relates to COPD.   • Dizziness     Has with low B/P, she states \"B/P has been 94/66\".   • Edema     Still having swelling in legs and ankles.       Cardiac Complaints  chest pressure/discomfort, dyspnea, lower extremity edema and palpitations      Subjective   Kendra Freitas is a 33 y.o. female with palpitations she has had on and off for many years. She has been seen by another cardiologist and also an electrophysiologist.  Apparently multiple medications were tried, which she admits she did not take regularly due to some kind of side effect.  In January 2017, she was referred to Dr. Barrett for evaluation of her palpitations. At her consultation Lopressor and fludrocortisone were recommended. She then underwent stress and echo that were negative for ischemia or arrhythmia. She later stopped taking both medications due to side effects but continued to use marijuana routinely to control her palpitations. At last visit, she reported she had quit using marijuana for palpitation management. She does report COPD causes some issues with shortness of breath and sharp chest pain but admits it comes and goes quickly lasting only a few seconds to a few minutes and goes away on it's own.  She does have dizziness occasionally but only when her blood pressure is low.  She does have some edema in BLE but no worse than prior, she admits to taking several diuretics a day for management.  Labs are done with PCP.  She is unsure when checked last but admits Litzy Pinto did them.  She does have a long standing history of substance abuse and reports recently quitting meth around 3 months ago after a stent in " rehab.  No refills needed.            Cardiac History  Past Surgical History:   Procedure Laterality Date   • CARDIOVASCULAR STRESS TEST  2017    Lexiscan- no evidence of ischemia   • ECHO - CONVERTED  2017    EF 50-55%, mild MR, RVSP 23 mmHg       Current Outpatient Medications   Medication Sig Dispense Refill   • albuterol sulfate  (90 Base) MCG/ACT inhaler Inhale 2 puffs Every 4 (Four) Hours As Needed for Wheezing.     • aspirin 81 MG EC tablet Take 81 mg by mouth Daily.     • bumetanide (BUMEX) 1 MG tablet Take 1 mg by mouth Daily.     • busPIRone (BUSPAR) 5 MG tablet Take 5 mg by mouth 3 (Three) Times a Day.     • cetirizine (zyrTEC) 10 MG tablet Take 10 mg by mouth Daily.     • clonazePAM (KlonoPIN) 0.5 MG tablet Take 0.5 mg by mouth Daily.     • DULoxetine (CYMBALTA) 60 MG capsule Take 60 mg by mouth Daily.     • furosemide (LASIX) 40 MG tablet Take 40 mg by mouth 2 (Two) Times a Day.     • lactulose (CHRONULAC) 10 GM/15ML solution Take 20 g by mouth Daily.     • montelukast (SINGULAIR) 10 MG tablet Take 10 mg by mouth Daily.     • nitrofurantoin (MACRODANTIN) 100 MG capsule Take 100 mg by mouth 4 (Four) Times a Day.     • omeprazole (priLOSEC) 20 MG capsule Take 20 mg by mouth Daily.     • ondansetron (ZOFRAN) 4 MG tablet Take 4 mg by mouth Every 8 (Eight) Hours As Needed for nausea or vomiting.     • phenazopyridine (PYRIDIUM) 200 MG tablet Take 200 mg by mouth 3 (Three) Times a Day.     • predniSONE (DELTASONE) 20 MG tablet Take 20 mg by mouth Daily.     • QUEtiapine (SEROquel) 50 MG tablet Take 50 mg by mouth Every Night.     • spironolactone (ALDACTONE) 25 MG tablet Take 25 mg by mouth Daily.       No current facility-administered medications for this visit.        Contrast dye; Garlic; Ibuprofen; Phenergan [promethazine hcl]; Prenatal complete; and Amoxicillin    Past Medical History:   Diagnosis Date   • Asthma    • H/O  section    • History of appendectomy    • History of  ear surgery     right   • Lupus    • Seizure (CMS/HCC)     Pt states only related to taking ibuprofen       Social History     Socioeconomic History   • Marital status: Unknown     Spouse name: Not on file   • Number of children: Not on file   • Years of education: Not on file   • Highest education level: Not on file   Social Needs   • Financial resource strain: Not on file   • Food insecurity - worry: Not on file   • Food insecurity - inability: Not on file   • Transportation needs - medical: Not on file   • Transportation needs - non-medical: Not on file   Occupational History   • Not on file   Tobacco Use   • Smoking status: Former Smoker     Last attempt to quit:      Years since quittin.1   • Smokeless tobacco: Current User     Types: Snuff   Substance and Sexual Activity   • Alcohol use: Not on file   • Drug use: No   • Sexual activity: Defer   Other Topics Concern   • Not on file   Social History Narrative   • Not on file       Family History   Problem Relation Age of Onset   • Heart disease Father    • Hypertension Father    • COPD Father    • Diabetes Father    • Alzheimer's disease Father        Review of Systems   Constitution: Negative for weakness and malaise/fatigue.   Cardiovascular: Positive for chest pain, dyspnea on exertion, leg swelling and palpitations. Negative for claudication, near-syncope, orthopnea and syncope.   Respiratory: Positive for shortness of breath. Negative for cough and wheezing.    Musculoskeletal: Negative for back pain and joint pain.   Gastrointestinal: Negative for anorexia, heartburn, nausea and vomiting.   Genitourinary: Negative for dysuria, hematuria, hesitancy and nocturia.   Neurological: Negative for dizziness, light-headedness and loss of balance.   Psychiatric/Behavioral: Positive for substance abuse. Negative for depression and memory loss. The patient is nervous/anxious.            Objective     /82 (BP Location: Left arm)   Pulse 76   Ht 157.5  "cm (62.01\")   Wt 91.6 kg (202 lb)   BMI 36.94 kg/m²     Physical Exam   Constitutional: She is oriented to person, place, and time. She appears well-developed and well-nourished.   HENT:   Head: Normocephalic and atraumatic.   Eyes: EOM are normal. Pupils are equal, round, and reactive to light.   Neck: Normal range of motion. Neck supple.   Cardiovascular: Normal rate and regular rhythm.   Pulmonary/Chest: Effort normal and breath sounds normal.   Abdominal: Soft.   Musculoskeletal: Normal range of motion.   Neurological: She is alert and oriented to person, place, and time.   Skin: Skin is warm and dry.   Psychiatric: She has a normal mood and affect. Her behavior is normal.       Procedures    Assessment/Plan     HR is stable today and regular.  Patient continues to have some palpitations and what she describes as racing but admits it has been the same for many years.  In the past, she was tried on multiple medications to control and was intolerant to them all.  Since they are no worse than before, same advised.  If they should worsen, patient aware to call for further recommendations.  She does also report some shortness of breath on exertion which she relates to underlying lung issues and remains unchanged.  Patient advised to use inhaler as needed.  Sharp chest pain exists when she is stressed but patient admits only for a few seconds and it is gone.  She was urged to try different techniques to calm herself when this occurs.  Patient does report some bilateral lower extremity edema that she felt was severe, on assessment, non pitting edema noted to BLE.  No change in diuretic advised.  Patient was counseled to limit sodium intake.  Patient does have a long standing history of substance abuse but does report being clean from meth about 3 months now.  She was praised for her efforts.  Unfortunately, she has not been successful in tobacco cessation with snuff.  We discussed benefits and risks but patient " states she is unwilling to give it up.  Labs and refills she states are done with your office. BMI remains elevated at 36.94, good cardiac diet with limited caloric intake, fast food, caffeine, and sodium advised.  Yearly follow up scheduled with patient advised to call with concerns.        Problems Addressed this Visit        Other    Anxiety    Nicotine dependence      Other Visit Diagnoses     Palpitations    -  Primary    Chest pain, atypical        Localized edema        Shortness of breath        Obesity (BMI 30-39.9)        History of substance abuse        Mild intermittent asthma, unspecified whether complicated        Relevant Medications    montelukast (SINGULAIR) 10 MG tablet    albuterol sulfate  (90 Base) MCG/ACT inhaler          Patient's Body mass index is 36.94 kg/m². BMI is above normal parameters. Recommendations include: nutrition counseling.        Electronically signed by NALINI Erickson February 15, 2019 10:49 AM

## 2019-05-29 ENCOUNTER — TELEPHONE (OUTPATIENT)
Dept: CARDIOLOGY | Facility: CLINIC | Age: 34
End: 2019-05-29

## 2019-05-29 NOTE — TELEPHONE ENCOUNTER
"Spoke with patient about her complaint of chest pain , she states \" oh honey I had a mild heart attack on the 24th , there is no chest pain about it \" I explained to  Her that we had an opening tomorrow at 12 pm if she would like to come in office . She is going to take the appointment . Will call Yany at [facility] and make Yany aware.  "

## 2019-05-29 NOTE — TELEPHONE ENCOUNTER
Received phone call from Yany at [facility] , she states patient had been in there office to see Vikram Mederos with complaints of chest pain ,requesting an appointment. Her next appointment is 02-13-20 and her last appt was  . Do you want any testing or labs prior to appointment ?

## 2019-05-30 ENCOUNTER — TELEPHONE (OUTPATIENT)
Dept: CARDIOLOGY | Facility: CLINIC | Age: 34
End: 2019-05-30

## 2019-05-30 ENCOUNTER — OFFICE VISIT (OUTPATIENT)
Dept: CARDIOLOGY | Facility: CLINIC | Age: 34
End: 2019-05-30

## 2019-05-30 VITALS
BODY MASS INDEX: 38.46 KG/M2 | DIASTOLIC BLOOD PRESSURE: 90 MMHG | SYSTOLIC BLOOD PRESSURE: 120 MMHG | WEIGHT: 209 LBS | HEART RATE: 72 BPM | HEIGHT: 62 IN

## 2019-05-30 DIAGNOSIS — F31.32 BIPOLAR AFFECTIVE DISORDER, CURRENTLY DEPRESSED, MODERATE (HCC): ICD-10-CM

## 2019-05-30 DIAGNOSIS — F17.298 OTHER TOBACCO PRODUCT NICOTINE DEPENDENCE WITH OTHER NICOTINE-INDUCED DISORDER: ICD-10-CM

## 2019-05-30 DIAGNOSIS — R53.82 CHRONIC FATIGUE: ICD-10-CM

## 2019-05-30 DIAGNOSIS — J44.9 COPD MIXED TYPE (HCC): ICD-10-CM

## 2019-05-30 DIAGNOSIS — I34.0 NON-RHEUMATIC MITRAL REGURGITATION: ICD-10-CM

## 2019-05-30 DIAGNOSIS — R01.1 HEART MURMUR: ICD-10-CM

## 2019-05-30 DIAGNOSIS — R60.0 LOCALIZED EDEMA: ICD-10-CM

## 2019-05-30 DIAGNOSIS — F41.9 ANXIETY: ICD-10-CM

## 2019-05-30 DIAGNOSIS — R00.2 PALPITATIONS: ICD-10-CM

## 2019-05-30 DIAGNOSIS — R06.09 DYSPNEA ON EXERTION: Primary | ICD-10-CM

## 2019-05-30 DIAGNOSIS — E66.01 SEVERE OBESITY (BMI 35.0-39.9) WITH COMORBIDITY (HCC): ICD-10-CM

## 2019-05-30 PROCEDURE — 99214 OFFICE O/P EST MOD 30 MIN: CPT | Performed by: NURSE PRACTITIONER

## 2019-05-30 RX ORDER — CHOLECALCIFEROL (VITAMIN D3) 125 MCG
500 CAPSULE ORAL DAILY
COMMUNITY
End: 2019-12-23 | Stop reason: ALTCHOICE

## 2019-05-30 RX ORDER — BENZONATATE 200 MG/1
200 CAPSULE ORAL 3 TIMES DAILY PRN
COMMUNITY
End: 2020-06-26

## 2019-05-30 RX ORDER — METOPROLOL SUCCINATE 25 MG/1
25 TABLET, EXTENDED RELEASE ORAL DAILY
Qty: 90 TABLET | Refills: 3 | Status: SHIPPED | OUTPATIENT
Start: 2019-05-30 | End: 2019-12-23

## 2019-05-30 RX ORDER — BUSPIRONE HYDROCHLORIDE 15 MG/1
15 TABLET ORAL 2 TIMES DAILY
COMMUNITY
End: 2022-08-10 | Stop reason: DRUGHIGH

## 2019-05-30 RX ORDER — DICYCLOMINE HCL 20 MG
20 TABLET ORAL EVERY 6 HOURS
COMMUNITY

## 2019-05-30 RX ORDER — CYCLOBENZAPRINE HCL 10 MG
10 TABLET ORAL 3 TIMES DAILY PRN
COMMUNITY
End: 2019-12-23 | Stop reason: ALTCHOICE

## 2019-05-30 NOTE — TELEPHONE ENCOUNTER
Can we make sure PCP is replacing her K.  It was 3.2 in hospital.  They have her on bumex and lasix.
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
## 2019-05-31 NOTE — TELEPHONE ENCOUNTER
Patient brought medication list from hospital and went over verbally from that list and the list we had what medications she was taking. When I had asked her about potassium she stated that she didn't think she was taking.

## 2019-05-31 NOTE — TELEPHONE ENCOUNTER
I spoke with Zenaida at PCP office who states that they have patient on potassium 10 mEq BID and that it was refilled in April. She was made aware that potassium was 3.2 when she was in hospital last week.

## 2019-06-03 ENCOUNTER — HOSPITAL ENCOUNTER (OUTPATIENT)
Dept: CARDIOLOGY | Facility: HOSPITAL | Age: 34
Discharge: HOME | End: 2019-06-03
Admitting: NURSE PRACTITIONER

## 2019-06-03 VITALS — BODY MASS INDEX: 38.46 KG/M2 | HEIGHT: 62 IN | WEIGHT: 209 LBS

## 2019-06-03 DIAGNOSIS — R06.09 DYSPNEA ON EXERTION: ICD-10-CM

## 2019-06-03 DIAGNOSIS — R01.1 HEART MURMUR: ICD-10-CM

## 2019-06-03 DIAGNOSIS — I34.0 NON-RHEUMATIC MITRAL REGURGITATION: ICD-10-CM

## 2019-06-03 LAB
BH CV ECHO MEAS - AO MAX PG: 11.3 MMHG
BH CV ECHO MEAS - AO MEAN PG: 7.1 MMHG
BH CV ECHO MEAS - AO ROOT AREA (BSA CORRECTED): 1.4
BH CV ECHO MEAS - AO ROOT AREA: 5.6 CM^2
BH CV ECHO MEAS - AO ROOT DIAM: 2.7 CM
BH CV ECHO MEAS - AO V2 MAX: 168 CM/SEC
BH CV ECHO MEAS - AO V2 MEAN: 128.4 CM/SEC
BH CV ECHO MEAS - AO V2 VTI: 40.4 CM
BH CV ECHO MEAS - BSA(HAYCOCK): 2.1 M^2
BH CV ECHO MEAS - BSA: 1.9 M^2
BH CV ECHO MEAS - BZI_BMI: 38.2 KILOGRAMS/M^2
BH CV ECHO MEAS - BZI_METRIC_HEIGHT: 157.5 CM
BH CV ECHO MEAS - BZI_METRIC_WEIGHT: 94.8 KG
BH CV ECHO MEAS - EDV(CUBED): 75.3 ML
BH CV ECHO MEAS - EDV(TEICH): 79.6 ML
BH CV ECHO MEAS - EF(CUBED): 72 %
BH CV ECHO MEAS - EF(TEICH): 64 %
BH CV ECHO MEAS - ESV(CUBED): 21.1 ML
BH CV ECHO MEAS - ESV(TEICH): 28.6 ML
BH CV ECHO MEAS - FS: 34.5 %
BH CV ECHO MEAS - IVS/LVPW: 0.95
BH CV ECHO MEAS - IVSD: 0.93 CM
BH CV ECHO MEAS - LA DIMENSION: 3 CM
BH CV ECHO MEAS - LA/AO: 1.1
BH CV ECHO MEAS - LAT PEAK E' VEL: 13 CM/SEC
BH CV ECHO MEAS - LV IVRT: 0.11 SEC
BH CV ECHO MEAS - LV MASS(C)D: 129.7 GRAMS
BH CV ECHO MEAS - LV MASS(C)DI: 66.6 GRAMS/M^2
BH CV ECHO MEAS - LVIDD: 4.2 CM
BH CV ECHO MEAS - LVIDS: 2.8 CM
BH CV ECHO MEAS - LVPWD: 0.98 CM
BH CV ECHO MEAS - MED PEAK E' VEL: 11 CM/SEC
BH CV ECHO MEAS - MITRAL HR: 132.4 BPM
BH CV ECHO MEAS - MITRAL R-R: 0.45 SEC
BH CV ECHO MEAS - MV A MAX VEL: 82.2 CM/SEC
BH CV ECHO MEAS - MV DEC SLOPE: 484.7 CM/SEC^2
BH CV ECHO MEAS - MV DEC TIME: 0.21 SEC
BH CV ECHO MEAS - MV E MAX VEL: 102.2 CM/SEC
BH CV ECHO MEAS - MV E/A: 1.2
BH CV ECHO MEAS - MV MAX PG: 4.4 MMHG
BH CV ECHO MEAS - MV MEAN PG: 2 MMHG
BH CV ECHO MEAS - MV V2 MAX: 104.7 CM/SEC
BH CV ECHO MEAS - MV V2 MEAN: 66.5 CM/SEC
BH CV ECHO MEAS - MV V2 VTI: 29.9 CM
BH CV ECHO MEAS - PA MAX PG: 6.2 MMHG
BH CV ECHO MEAS - PA MEAN PG: 3.5 MMHG
BH CV ECHO MEAS - PA V2 MAX: 124.9 CM/SEC
BH CV ECHO MEAS - PA V2 MEAN: 87.2 CM/SEC
BH CV ECHO MEAS - PA V2 VTI: 33.8 CM
BH CV ECHO MEAS - PULM. HR: 153.5 BPM
BH CV ECHO MEAS - PULM. R-R: 0.39 SEC
BH CV ECHO MEAS - RAP SYSTOLE: 10 MMHG
BH CV ECHO MEAS - RVDD: 2.3 CM
BH CV ECHO MEAS - RVSP: 31 MMHG
BH CV ECHO MEAS - SI(AO): 115.8 ML/M^2
BH CV ECHO MEAS - SI(CUBED): 27.8 ML/M^2
BH CV ECHO MEAS - SI(TEICH): 26.2 ML/M^2
BH CV ECHO MEAS - SV(AO): 225.5 ML
BH CV ECHO MEAS - SV(CUBED): 54.2 ML
BH CV ECHO MEAS - SV(TEICH): 51 ML
BH CV ECHO MEAS - TR MAX VEL: 224.9 CM/SEC
BH CV ECHO MEASUREMENTS AVERAGE E/E' RATIO: 8.52
MAXIMAL PREDICTED HEART RATE: 186 BPM
STRESS TARGET HR: 158 BPM

## 2019-06-03 PROCEDURE — 93306 TTE W/DOPPLER COMPLETE: CPT | Performed by: INTERNAL MEDICINE

## 2019-06-03 PROCEDURE — 93306 TTE W/DOPPLER COMPLETE: CPT

## 2019-06-03 NOTE — PROGRESS NOTES
Please let her know, LV function is normal, she does not have diastolic dysfunction.  Valve still looks good.  Needs to find a lung doctor to take her insurance.

## 2019-06-26 ENCOUNTER — TELEPHONE (OUTPATIENT)
Dept: CARDIOLOGY | Facility: CLINIC | Age: 34
End: 2019-06-26

## 2019-06-26 NOTE — TELEPHONE ENCOUNTER
Pt came by asking for copy of echo and for someone to discuss results. Release signed, discussed results at length with patient and boyfriend. Both voiced understanding. Copy of echo given to patient.

## 2019-12-23 ENCOUNTER — OFFICE VISIT (OUTPATIENT)
Dept: CARDIOLOGY | Facility: CLINIC | Age: 34
End: 2019-12-23

## 2019-12-23 VITALS
WEIGHT: 235 LBS | SYSTOLIC BLOOD PRESSURE: 132 MMHG | BODY MASS INDEX: 43.24 KG/M2 | DIASTOLIC BLOOD PRESSURE: 86 MMHG | HEIGHT: 62 IN

## 2019-12-23 DIAGNOSIS — R00.2 PALPITATIONS: ICD-10-CM

## 2019-12-23 DIAGNOSIS — R60.0 BILATERAL LEG EDEMA: ICD-10-CM

## 2019-12-23 DIAGNOSIS — F17.298 OTHER TOBACCO PRODUCT NICOTINE DEPENDENCE WITH OTHER NICOTINE-INDUCED DISORDER: ICD-10-CM

## 2019-12-23 DIAGNOSIS — E66.01 CLASS 3 SEVERE OBESITY DUE TO EXCESS CALORIES WITH SERIOUS COMORBIDITY AND BODY MASS INDEX (BMI) OF 40.0 TO 44.9 IN ADULT (HCC): ICD-10-CM

## 2019-12-23 PROCEDURE — 99214 OFFICE O/P EST MOD 30 MIN: CPT | Performed by: NURSE PRACTITIONER

## 2019-12-23 RX ORDER — METOPROLOL SUCCINATE 50 MG/1
50 TABLET, EXTENDED RELEASE ORAL DAILY
Qty: 30 TABLET | Refills: 11 | Status: SHIPPED | OUTPATIENT
Start: 2019-12-23 | End: 2020-06-26 | Stop reason: SDUPTHER

## 2019-12-23 RX ORDER — HYDROCODONE BITARTRATE AND ACETAMINOPHEN 5; 325 MG/1; MG/1
1 TABLET ORAL EVERY 6 HOURS PRN
COMMUNITY

## 2019-12-23 RX ORDER — QUETIAPINE FUMARATE 100 MG/1
100 TABLET, FILM COATED ORAL NIGHTLY
COMMUNITY
End: 2020-06-26

## 2019-12-23 NOTE — PROGRESS NOTES
"Chief Complaint   Patient presents with   • Follow-up     cardiac management. Had labs done earlier this month per PCP.   • Chest Pain     Has occasional chest pain and SOA-has COPD   • Palpitations     Has occasional episodes of palpitations.   • Med Refill     No refills needed today. Reviewed meds verbally       Subjective       Kendra Chaney is a 34 y.o. female with mitral regurgitation, anxiety, chronic BLE edema, COPD, and palpitations she has had on and off for many years. She has been seen by another cardiologist and also an electrophysiologist.  Apparently multiple medications were tried, which she admitted she did not take regularly due to some kind of side effect.  In January 2017, she was referred to Dr. Barrett for evaluation of her palpitations. At her consultation Lopressor and fludrocortisone were recommended. She then underwent stress and echo that were negative for ischemia or arrhythmia. She later stopped taking both medications due to side effects but continued to use marijuana routinely to control her palpitations. Later, she reported she had stopped marijuana.  She did report continued palpitations about the same as prior along with edema, managed with multiple diuretics.     In June 2019,  PCP requested follow up visit as patient was seen at [facility] for chest pain. Patient reported conflicting health history at that time, including having experienced an MI. Hospital records showed Troponin I negative x 2, chest xray showing mild cardiomegaly similar to before, EKG negative for acute ST changes, and drug screen positive for opiates.  It appears behavioral health was consulted. Patient also reported concern over very leaky heart valves as she continued to have shortness of breath.   Echo 5/30/19 showed LVEF 61-65%, mild MR and TR, no AR or AS. RVSP 31 mmHg.     Today she returns to the office for follow-up visit.  She states she has \"been off meth\" for over a year now.  She denies worsening " "shortness of breath or significant chest pain.  She continues have swelling in her lower legs and states she has \"testing ordered\".  Her main cardiac concern today is episodic palpitations that occur randomly.  No significant associated symptoms noted.    HPI     Cardiac History:    Past Surgical History:   Procedure Laterality Date   • CARDIOVASCULAR STRESS TEST  01/12/2017    Lexiscan- no evidence of ischemia   • ECHO - CONVERTED  01/12/2017    EF 50-55%, mild MR, RVSP 23 mmHg   • ECHO - CONVERTED  06/03/2019    EF 65%. Mild MR. RVSP- 31 mmHg.       Current Outpatient Medications   Medication Sig Dispense Refill   • aspirin 81 MG EC tablet Take 81 mg by mouth Daily.     • benzonatate (TESSALON) 200 MG capsule Take 200 mg by mouth 3 (Three) Times a Day As Needed for Cough.     • bumetanide (BUMEX) 1 MG tablet Take 1 mg by mouth Daily.     • busPIRone (BUSPAR) 15 MG tablet Take 15 mg by mouth 2 (Two) Times a Day.     • cetirizine (zyrTEC) 10 MG tablet Take 10 mg by mouth Daily.     • dicyclomine (BENTYL) 20 MG tablet Take 20 mg by mouth Every 6 (Six) Hours.     • DULoxetine (CYMBALTA) 60 MG capsule Take 60 mg by mouth Daily.     • furosemide (LASIX) 40 MG tablet Take 40 mg by mouth 2 (Two) Times a Day.     • HYDROcodone-acetaminophen (NORCO) 5-325 MG per tablet Take 1 tablet by mouth Every 6 (Six) Hours As Needed.     • montelukast (SINGULAIR) 10 MG tablet Take 10 mg by mouth Daily.     • omeprazole (priLOSEC) 20 MG capsule Take 20 mg by mouth Daily.     • ondansetron (ZOFRAN) 4 MG tablet Take 4 mg by mouth Every 8 (Eight) Hours As Needed for nausea or vomiting.     • QUEtiapine (SEROquel) 100 MG tablet Take 100 mg by mouth Every Night.     • spironolactone (ALDACTONE) 25 MG tablet Take 25 mg by mouth Daily.     • albuterol sulfate  (90 Base) MCG/ACT inhaler Inhale 2 puffs Every 4 (Four) Hours As Needed for Wheezing.     • metoprolol succinate XL (TOPROL-XL) 50 MG 24 hr tablet Take 1 tablet by mouth Daily. 30 " tablet 11     No current facility-administered medications for this visit.        Contrast dye; Garlic; Ibuprofen; Phenergan [promethazine hcl]; Prenatal complete; Risperidone; Amoxicillin; Folic acid; Iodinated diagnostic agents; Iron; Penicillins; and Seroquel [quetiapine]    Past Medical History:   Diagnosis Date   • Asthma    • H/O  section    • History of appendectomy    • History of ear surgery     right   • Lupus (CMS/HCC)    • Seizure (CMS/HCC)     Pt states only related to taking ibuprofen       Social History     Socioeconomic History   • Marital status: Unknown     Spouse name: Not on file   • Number of children: Not on file   • Years of education: Not on file   • Highest education level: Not on file   Tobacco Use   • Smoking status: Former Smoker     Last attempt to quit:      Years since quittin.9   • Smokeless tobacco: Current User     Types: Snuff   Substance and Sexual Activity   • Alcohol use: No     Alcohol/week: 7.0 standard drinks     Types: 7 Cans of beer per week     Frequency: Never   • Drug use: No   • Sexual activity: Defer       Family History   Problem Relation Age of Onset   • Heart disease Father    • Hypertension Father    • COPD Father    • Diabetes Father    • Alzheimer's disease Father        Review of Systems   Constitution: Negative for decreased appetite, fever and malaise/fatigue.   HENT: Negative for congestion and nosebleeds.    Eyes: Negative for redness and visual disturbance.   Cardiovascular: Positive for leg swelling (Not a new problem) and palpitations. Negative for chest pain and near-syncope.   Respiratory: Positive for shortness of breath. Negative for cough.    Endocrine: Negative for polydipsia, polyphagia and polyuria.   Hematologic/Lymphatic: Negative for bleeding problem. Does not bruise/bleed easily.   Skin: Negative for color change and itching.   Musculoskeletal: Positive for muscle cramps (At times). Negative for falls and muscle weakness.  "  Gastrointestinal: Negative for change in bowel habit, dysphagia and melena.   Genitourinary: Negative for dysuria and hematuria.   Neurological: Negative for dizziness, headaches and loss of balance.   Psychiatric/Behavioral: Positive for depression and substance abuse (By history). Negative for suicidal ideas. The patient is nervous/anxious.    Allergic/Immunologic: Negative for hives and persistent infections.        Objective     /86 (BP Location: Left arm)   Ht 157 cm (61.81\")   Wt 107 kg (235 lb)   BMI 43.25 kg/m²     Physical Exam   Constitutional: She is oriented to person, place, and time. She appears well-nourished.   HENT:   Head: Normocephalic.   Eyes: Pupils are equal, round, and reactive to light. Conjunctivae are normal.   Neck: Normal range of motion. Neck supple. Carotid bruit is not present.   Cardiovascular: Normal rate, regular rhythm, S1 normal and S2 normal.   No murmur heard.  Pulses:       Radial pulses are 2+ on the right side, and 2+ on the left side.        Dorsalis pedis pulses are 2+ on the right side, and 2+ on the left side.   Pulmonary/Chest: Breath sounds normal. She has no rales.   Abdominal: Soft. Bowel sounds are normal. There is no tenderness.   Musculoskeletal: Normal range of motion. She exhibits edema. She exhibits no tenderness.   Neurological: She is alert and oriented to person, place, and time.   Skin: Skin is warm and dry.   Psychiatric: She has a normal mood and affect.        Procedures: none today         Assessment/Plan      Kendra was seen today for follow-up, chest pain, palpitations and med refill.    Diagnoses and all orders for this visit:    Palpitations    Bilateral leg edema    Other tobacco product nicotine dependence with other nicotine-induced disorder    Class 3 severe obesity due to excess calories with serious comorbidity and body mass index (BMI) of 40.0 to 44.9 in adult (CMS/[facility])    Other orders  -     metoprolol succinate XL (TOPROL-XL) 50 " MG 24 hr tablet; Take 1 tablet by mouth Daily.      Patient presents today with concern over persistent palpitations.  Her blood pressure is stable.  We will try increasing the Toprol succinate to 50 mg once a day.  If she continues to have issues she understands to call the office.  I gave her handout on heart palpitations and triggers to avoid including caffeine.  She will follow with you for lab orders/management.  Please forward copy of next lab results.  If palpitations persist despite conservative measures then cardiac monitor will be advised.    Patient's Body mass index is 43.25 kg/m². BMI is above normal parameters. Recommendations include: nutrition counseling.  Her weight is up around 30 pounds since last office visit.  Diet for weight loss encouraged.  Patient feels her weight gain is related to discontinuation of illicit drug use.    According to patient she is to have testing done on her lower legs due to persistent edema.  If problem persist then consider referral to Dr. Marshall for work-up and management of venous insufficiency vs. lymphedema.     Kendra Chaney is a current smokeless tobacco user.  She currently  Uses tobacco dip, one can per day for a duration of 20 years. I have educated her on the risk of diseases from using tobacco products such as cancer, COPD and heart diease. I advised her to quit and she is not willing to quit. I spent 3  minutes counseling the patient.    A 6-month follow-up visit scheduled.  Please call sooner for any cardiac concerns.            Electronically signed by NALINI Glass,  December 23, 2019 5:00 PM

## 2019-12-23 NOTE — PATIENT INSTRUCTIONS
Palpitations  Palpitations are feelings that your heartbeat is not normal. Your heartbeat may feel like it is:  · Uneven.  · Faster than normal.  · Fluttering.  · Skipping a beat.  This is usually not a serious problem. In some cases, you may need tests to rule out any serious problems.  Follow these instructions at home:  Pay attention to any changes in your condition. Take these actions to help manage your symptoms:  Eating and drinking  · Avoid:  ? Coffee, tea, soft drinks, and energy drinks.  ? Chocolate.  ? Alcohol.  ? Diet pills.  Lifestyle    · Try to lower your stress. These things can help you relax:  ? Yoga.  ? Deep breathing and meditation.  ? Exercise.  ? Using words and images to create positive thoughts (guided imagery).  ? Using your mind to control things in your body (biofeedback).  · Do not use drugs.  · Get plenty of rest and sleep. Keep a regular bed time.  General instructions    · Take over-the-counter and prescription medicines only as told by your doctor.  · Do not use any products that contain nicotine or tobacco, such as cigarettes and e-cigarettes. If you need help quitting, ask your doctor.  · Keep all follow-up visits as told by your doctor. This is important. You may need more tests if palpitations do not go away or get worse.  Contact a doctor if:  · Your symptoms last more than 24 hours.  · Your symptoms occur more often.  Get help right away if you:  · Have chest pain.  · Feel short of breath.  · Have a very bad headache.  · Feel dizzy.  · Pass out (faint).  Summary  · Palpitations are feelings that your heartbeat is uneven or faster than normal. It may feel like your heart is fluttering or skipping a beat.  · Avoid food and drinks that may cause palpitations. These include caffeine, chocolate, and alcohol.  · Try to lower your stress. Do not smoke or use drugs.  · Get help right away if you faint or have chest pain, shortness of breath, a severe headache, or dizziness.  This  information is not intended to replace advice given to you by your health care provider. Make sure you discuss any questions you have with your health care provider.  Document Released: 09/26/2009 Document Revised: 01/30/2019 Document Reviewed: 01/30/2019  [employer] Interactive Patient Education © 2019 [employer]

## 2020-06-09 RX ORDER — METOPROLOL SUCCINATE 25 MG/1
25 TABLET, EXTENDED RELEASE ORAL DAILY
Qty: 90 TABLET | Refills: 3 | OUTPATIENT
Start: 2020-06-09

## 2020-06-25 PROBLEM — J44.9 COPD (CHRONIC OBSTRUCTIVE PULMONARY DISEASE) (HCC): Status: ACTIVE | Noted: 2020-06-25

## 2020-06-25 PROBLEM — R06.02 SHORTNESS OF BREATH: Status: ACTIVE | Noted: 2020-06-25

## 2020-06-25 PROBLEM — R60.0 BILATERAL LEG EDEMA: Status: ACTIVE | Noted: 2020-06-25

## 2020-06-25 PROBLEM — R56.9 SEIZURE (HCC): Status: ACTIVE | Noted: 2020-06-25

## 2020-06-25 PROBLEM — J45.909 ASTHMA: Status: ACTIVE | Noted: 2020-06-25

## 2020-06-25 PROBLEM — I25.2 OLD MI (MYOCARDIAL INFARCTION): Status: ACTIVE | Noted: 2020-06-25

## 2020-06-25 PROBLEM — R07.2 PRECORDIAL PAIN: Status: ACTIVE | Noted: 2020-06-25

## 2020-06-25 PROBLEM — R00.2 PALPITATIONS: Status: ACTIVE | Noted: 2020-06-25

## 2020-06-25 NOTE — PATIENT INSTRUCTIONS
Obesity, Adult  Obesity is the condition of having too much total body fat. Being overweight or obese means that your weight is greater than what is considered healthy for your body size. Obesity is determined by a measurement called BMI. BMI is an estimate of body fat and is calculated from height and weight. For adults, a BMI of 30 or higher is considered obese.  Obesity can lead to other health concerns and major illnesses, including:  · Stroke.  · Coronary artery disease (CAD).  · Type 2 diabetes.  · Some types of cancer, including cancers of the colon, breast, uterus, and gallbladder.  · Osteoarthritis.  · High blood pressure (hypertension).  · High cholesterol.  · Sleep apnea.  · Gallbladder stones.  · Infertility problems.  What are the causes?  Common causes of this condition include:  · Eating daily meals that are high in calories, sugar, and fat.  · Being born with genes that may make you more likely to become obese.  · Having a medical condition that causes obesity, including:  ? Hypothyroidism.  ? Polycystic ovarian syndrome (PCOS).  ? Binge-eating disorder.  ? Cushing syndrome.  · Taking certain medicines, such as steroids, antidepressants, and seizure medicines.  · Not being physically active (sedentary lifestyle).  · Not getting enough sleep.  · Drinking high amounts of sugar-sweetened beverages, such as soft drinks.  What increases the risk?  The following factors may make you more likely to develop this condition:  · Having a family history of obesity.  · Being a woman of  descent.  · Being a man of  descent.  · Living in an area with limited access to:  ? Lares, recreation centers, or sidewalks.  ? Healthy food choices, such as grocery stores and farmers' markets.  What are the signs or symptoms?  The main sign of this condition is having too much body fat.  How is this diagnosed?  This condition is diagnosed based on:  · Your BMI. If you are an adult with a BMI of 30 or  higher, you are considered obese.  · Your waist circumference. This measures the distance around your waistline.  · Your skinfold thickness. Your health care provider may gently pinch a fold of your skin and measure it.  You may have other tests to check for underlying conditions.  How is this treated?  Treatment for this condition often includes changing your lifestyle. Treatment may include some or all of the following:  · Dietary changes. This may include developing a healthy meal plan.  · Regular physical activity. This may include activity that causes your heart to beat faster (aerobic exercise) and strength training. Work with your health care provider to design an exercise program that works for you.  · Medicine to help you lose weight if you are unable to lose 1 pound a week after 6 weeks of healthy eating and more physical activity.  · Treating conditions that cause the obesity (underlying conditions).  · Surgery. Surgical options may include gastric banding and gastric bypass. Surgery may be done if:  ? Other treatments have not helped to improve your condition.  ? You have a BMI of 40 or higher.  ? You have life-threatening health problems related to obesity.  Follow these instructions at home:  Eating and drinking    · Follow recommendations from your health care provider about what you eat and drink. Your health care provider may advise you to:  ? Limit fast food, sweets, and processed snack foods.  ? Choose low-fat options, such as low-fat milk instead of whole milk.  ? Eat 5 or more servings of fruits or vegetables every day.  ? Eat at home more often. This gives you more control over what you eat.  ? Choose healthy foods when you eat out.  ? Learn to read food labels. This will help you understand how much food is considered 1 serving.  ? Learn what a healthy serving size is.  ? Keep low-fat snacks available.  ? Limit sugary drinks, such as soda, fruit juice, sweetened iced tea, and flavored  milk.  · Drink enough water to keep your urine pale yellow.  · Do not follow a fad diet. Fad diets can be unhealthy and even dangerous.  Physical activity  · Exercise regularly, as told by your health care provider.  ? Most adults should get up to 150 minutes of moderate-intensity exercise every week.  ? Ask your health care provider what types of exercise are safe for you and how often you should exercise.  · Warm up and stretch before being active.  · Cool down and stretch after being active.  · Rest between periods of activity.  Lifestyle  · Work with your health care provider and a dietitian to set a weight-loss goal that is healthy and reasonable for you.  · Limit your screen time.  · Find ways to reward yourself that do not involve food.  · Do not drink alcohol if:  ? Your health care provider tells you not to drink.  ? You are pregnant, may be pregnant, or are planning to become pregnant.  · If you drink alcohol:  ? Limit how much you use to:  § 0-1 drink a day for women.  § 0-2 drinks a day for men.  ? Be aware of how much alcohol is in your drink. In the [place] one drink equals one 12 oz bottle of beer (355 mL), one 5 oz glass of wine (148 mL), or one 1½ oz glass of hard liquor (44 mL).  General instructions  · Keep a weight-loss journal to keep track of the food you eat and how much exercise you get.  · Take over-the-counter and prescription medicines only as told by your health care provider.  · Take vitamins and supplements only as told by your health care provider.  · Consider joining a support group. Your health care provider may be able to recommend a support group.  · Keep all follow-up visits as told by your health care provider. This is important.  Contact a health care provider if:  · You are unable to meet your weight loss goal after 6 weeks of dietary and lifestyle changes.  Get help right away if you are having:  · Trouble breathing.  · Suicidal thoughts or behaviors.  Summary  · Obesity is the  condition of having too much total body fat.  · Being overweight or obese means that your weight is greater than what is considered healthy for your body size.  · Work with your health care provider and a dietitian to set a weight-loss goal that is healthy and reasonable for you.  · Exercise regularly, as told by your health care provider. Ask your health care provider what types of exercise are safe for you and how often you should exercise.  This information is not intended to replace advice given to you by your health care provider. Make sure you discuss any questions you have with your health care provider.  Document Released: 01/25/2006 Document Revised: 08/22/2019 Document Reviewed: 08/22/2019  [employer] Patient Education © 2020 [employer]  MyPlate from [employer]    MyPlate is an outline of a general healthy diet based on the 2010 Dietary Guidelines for Americans, from the [place] Department of Agriculture (USDA). It sets guidelines for how much food you should eat from each food group based on your age, sex, and level of physical activity.  What are tips for following MyPlate?  To follow MyPlate recommendations:  · Eat a wide variety of fruits and vegetables, grains, and protein foods.  · Serve smaller portions and eat less food throughout the day.  · Limit portion sizes to avoid overeating.  · Enjoy your food.  · Get at least 150 minutes of exercise every week. This is about 30 minutes each day, 5 or more days per week.  It can be difficult to have every meal look like MyPlate. Think about MyPlate as eating guidelines for an entire day, rather than each individual meal.  Fruits and vegetables  · Make half of your plate fruits and vegetables.  · Eat many different colors of fruits and vegetables each day.  · For a 2,000 calorie daily food plan, eat:  ? 2½ cups of vegetables every day.  ? 2 cups of fruit every day.  · 1 cup is equal to:  ? 1 cup raw or cooked vegetables.  ? 1 cup raw fruit.  ? 1 medium-sized orange,  apple, or banana.  ? 1 cup 100% fruit or vegetable juice.  ? 2 cups raw leafy greens, such as lettuce, spinach, or kale.  ? ½ cup dried fruit.  Grains  · One fourth of your plate should be grains.  · Make at least half of the grains you eat each day whole grains.  · For a 2,000 calorie daily food plan, eat 6 oz of grains every day.  · 1 oz is equal to:  ? 1 slice bread.  ? 1 cup cereal.  ? ½ cup cooked rice, cereal, or pasta.  Protein  · One fourth of your plate should be protein.  · Eat a wide variety of protein foods, including meat, poultry, fish, eggs, beans, nuts, and tofu.  · For a 2,000 calorie daily food plan, eat 5½ oz of protein every day.  · 1 oz is equal to:  ? 1 oz meat, poultry, or fish.  ? ¼ cup cooked beans.  ? 1 egg.  ? ½ oz nuts or seeds.  ? 1 Tbsp peanut butter.  Dairy  · Drink fat-free or low-fat (1%) milk.  · Eat or drink dairy as a side to meals.  · For a 2,000 calorie daily food plan, eat or drink 3 cups of dairy every day.  · 1 cup is equal to:  ? 1 cup milk, yogurt, cottage cheese, or soy milk (soy beverage).  ? 2 oz processed cheese.  ? 1½ oz natural cheese.  Fats, oils, salt, and sugars  · Only small amounts of oils are recommended.  · Avoid foods that are high in calories and low in nutritional value (empty calories), like foods high in fat or added sugars.  · Choose foods that are low in salt (sodium). Choose foods that have less than 140 milligrams (mg) of sodium per serving.  · Drink water instead of sugary drinks. Drink enough water each day to keep your urine pale yellow.  Where to find support  · Work with your health care provider or a nutrition specialist (dietitian) to develop a customized eating plan that is right for you.  · Download an [employer]ancy (mobile application) to help you track your daily food intake.  Where to find more information  · Go to ChooseMyPlate.gov for more information.  · Learn more and log your daily food intake according to MyPlate using USDA's SuperTracker:  www.supertracker.usda.gov  Summary  · MyPlate is a general guideline for healthy eating from the USDA. It is based on the 2010 Dietary Guidelines for Americans.  · In general, fruits and vegetables should take up ½ of your plate, grains should take up ¼ of your plate, and protein should take up ¼ of your plate.  This information is not intended to replace advice given to you by your health care provider. Make sure you discuss any questions you have with your health care provider.  Document Released: 01/06/2009 Document Revised: 01/19/2019 Document Reviewed: 03/19/2018  [employer] Patient Education © 2020 [employer]

## 2020-06-26 ENCOUNTER — OFFICE VISIT (OUTPATIENT)
Dept: CARDIOLOGY | Facility: CLINIC | Age: 35
End: 2020-06-26

## 2020-06-26 VITALS
HEIGHT: 62 IN | BODY MASS INDEX: 45.01 KG/M2 | SYSTOLIC BLOOD PRESSURE: 119 MMHG | DIASTOLIC BLOOD PRESSURE: 73 MMHG | WEIGHT: 244.6 LBS | OXYGEN SATURATION: 100 % | HEART RATE: 81 BPM

## 2020-06-26 DIAGNOSIS — R06.02 SHORTNESS OF BREATH: Primary | ICD-10-CM

## 2020-06-26 DIAGNOSIS — F17.298 OTHER TOBACCO PRODUCT NICOTINE DEPENDENCE WITH OTHER NICOTINE-INDUCED DISORDER: ICD-10-CM

## 2020-06-26 DIAGNOSIS — I38 VHD (VALVULAR HEART DISEASE): ICD-10-CM

## 2020-06-26 DIAGNOSIS — R00.2 PALPITATIONS: ICD-10-CM

## 2020-06-26 DIAGNOSIS — R60.0 BILATERAL LEG EDEMA: ICD-10-CM

## 2020-06-26 DIAGNOSIS — R53.82 CHRONIC FATIGUE: ICD-10-CM

## 2020-06-26 PROCEDURE — 99213 OFFICE O/P EST LOW 20 MIN: CPT | Performed by: SPECIALIST

## 2020-06-26 RX ORDER — LEVOTHYROXINE SODIUM 0.03 MG/1
TABLET ORAL DAILY
COMMUNITY
Start: 2020-04-21 | End: 2020-09-21 | Stop reason: DRUGHIGH

## 2020-06-26 RX ORDER — METOPROLOL SUCCINATE 50 MG/1
50 TABLET, EXTENDED RELEASE ORAL DAILY
Qty: 30 TABLET | Refills: 11 | Status: SHIPPED | OUTPATIENT
Start: 2020-06-26 | End: 2020-06-29 | Stop reason: SDUPTHER

## 2020-06-26 RX ORDER — ERGOCALCIFEROL 1.25 MG/1
CAPSULE ORAL WEEKLY
COMMUNITY
Start: 2020-04-21

## 2020-06-26 RX ORDER — BUMETANIDE 1 MG/1
1 TABLET ORAL 2 TIMES DAILY
Qty: 180 TABLET | Refills: 1 | Status: SHIPPED | OUTPATIENT
Start: 2020-06-26 | End: 2020-06-29 | Stop reason: SDUPTHER

## 2020-06-26 RX ORDER — HYDROXYZINE PAMOATE 50 MG/1
CAPSULE ORAL NIGHTLY
COMMUNITY
Start: 2020-04-17

## 2020-06-26 RX ORDER — SPIRONOLACTONE 25 MG/1
25 TABLET ORAL DAILY
Qty: 90 TABLET | Refills: 1 | Status: SHIPPED | OUTPATIENT
Start: 2020-06-26 | End: 2020-06-29 | Stop reason: SDUPTHER

## 2020-06-26 NOTE — PROGRESS NOTES
Subjective   Follow up, shortness of breath  Kendra Chaney is a 35 y.o. female who presents to day for Establish Care (Here for f/u); Palpitations; and Rapid Heart Rate.    CHIEF COMPLIANT  Chief Complaint   Patient presents with   • Establish Care     Here for f/u   • Palpitations   • Rapid Heart Rate       Problem List:    1. Chest Pain  2. Shortness of breath  3. Palpitations  4. Mitral Regurg.  5. Old MI  6. [place]  7. COPD  8. BLE edema  10. Lupus  11. Past Drug use  12. Former smoker      Problem List Items Addressed This Visit        Cardiovascular and Mediastinum    VHD (valvular heart disease)    Overview     Echo 12/12 EF 60% mild MR, trace TR         Palpitations       Respiratory    Shortness of breath - Primary       Other    Fatigue    Nicotine dependence    Bilateral leg edema          HPI      She feels better, she had cholecystectomy, breathing is a bit better, she has lost weight, she denies chest pain, edema is stable, no palpitations, denies new symptoms                  PRIOR MEDS  Current Outpatient Medications on File Prior to Visit   Medication Sig Dispense Refill   • aspirin 81 MG EC tablet Take 81 mg by mouth Daily.     • bumetanide (BUMEX) 1 MG tablet Take 1 mg by mouth Daily.     • busPIRone (BUSPAR) 15 MG tablet Take 15 mg by mouth 2 (Two) Times a Day.     • dicyclomine (BENTYL) 20 MG tablet Take 20 mg by mouth Every 6 (Six) Hours.     • DULoxetine (CYMBALTA) 60 MG capsule Take 60 mg by mouth Daily.     • furosemide (LASIX) 40 MG tablet Take 40 mg by mouth 2 (Two) Times a Day.     • HYDROcodone-acetaminophen (NORCO) 5-325 MG per tablet Take 1 tablet by mouth Every 6 (Six) Hours As Needed.     • hydrOXYzine pamoate (VISTARIL) 50 MG capsule Every Night.     • levothyroxine (SYNTHROID, LEVOTHROID) 25 MCG tablet Daily.     • metoprolol succinate XL (TOPROL-XL) 50 MG 24 hr tablet Take 1 tablet by mouth Daily. 30 tablet 11   • montelukast (SINGULAIR) 10 MG tablet Take 10 mg by mouth Daily.       • omeprazole (priLOSEC) 20 MG capsule Take 20 mg by mouth Daily.     • ondansetron (ZOFRAN) 4 MG tablet Take 4 mg by mouth Every 8 (Eight) Hours As Needed for nausea or vomiting.     • spironolactone (ALDACTONE) 25 MG tablet Take 25 mg by mouth Daily.     • vitamin D (ERGOCALCIFEROL) 1.25 MG (69374 UT) capsule capsule 1 (One) Time Per Week.     • cetirizine (zyrTEC) 10 MG tablet Take 10 mg by mouth Daily.     • [DISCONTINUED] benzonatate (TESSALON) 200 MG capsule Take 200 mg by mouth 3 (Three) Times a Day As Needed for Cough.     • [DISCONTINUED] QUEtiapine (SEROquel) 100 MG tablet Take 100 mg by mouth Every Night.       No current facility-administered medications on file prior to visit.        ALLERGIES  Contrast dye; Garlic; Ibuprofen; Phenergan [promethazine hcl]; Prenatal complete; Risperidone; Amoxicillin; Folic acid; Iodinated diagnostic agents; Iron; Penicillins; and Seroquel [quetiapine]    HISTORY  Past Medical History:   Diagnosis Date   • Asthma    • Chest pain    • COPD (chronic obstructive pulmonary disease) (CMS/HCC)    • Drug use    • Edema    • H/O  section    • History of appendectomy    • History of ear surgery     right   • Lupus (CMS/HCC)    • Old MI (myocardial infarction)    • Palpitation    • Seizure (CMS/HCC)     Pt states only related to taking ibuprofen   • Shortness of breath        Social History     Socioeconomic History   • Marital status: Unknown     Spouse name: Not on file   • Number of children: Not on file   • Years of education: Not on file   • Highest education level: Not on file   Tobacco Use   • Smoking status: Former Smoker     Types: Cigarettes     Last attempt to quit: 2008     Years since quittin.4   • Smokeless tobacco: Current User     Types: Snuff   Substance and Sexual Activity   • Alcohol use: No     Alcohol/week: 7.0 standard drinks     Types: 7 Cans of beer per week     Frequency: Never   • Drug use: Yes     Types: Marijuana, Methamphetamines      "Comment: in past   • Sexual activity: Defer       Family History   Problem Relation Age of Onset   • Heart disease Father    • Hypertension Father    • COPD Father    • Diabetes Father    • Alzheimer's disease Father        Review of Systems   Constitutional: Positive for fatigue. Negative for activity change.   HENT: Positive for hearing loss (occas. rt. ear). Negative for congestion.    Eyes: Positive for visual disturbance (contact left eye). Negative for discharge and itching.   Respiratory: Positive for shortness of breath.    Cardiovascular: Positive for palpitations and leg swelling. Negative for chest pain.   Gastrointestinal: Negative.  Negative for abdominal distention and abdominal pain.   Endocrine: Negative.  Negative for cold intolerance and heat intolerance.   Genitourinary: Negative.  Negative for flank pain.   Musculoskeletal: Positive for arthralgias and myalgias.   Skin: Negative.  Negative for color change and pallor.   Allergic/Immunologic: Positive for environmental allergies.   Neurological: Positive for syncope (last episode approx. 2-3 yrs. ago).   Hematological: Bruises/bleeds easily (bruise).   Psychiatric/Behavioral: Positive for sleep disturbance.       Objective     VITALS: /73 (BP Location: Left arm, Patient Position: Sitting)   Pulse 81   Ht 157.5 cm (62\")   Wt 111 kg (244 lb 9.6 oz)   SpO2 100%   BMI 44.74 kg/m²     LABS:   Lab Results (most recent)     None          IMAGING:   No Images in the past 120 days found..    EXAM:  Physical Exam   Constitutional: She is oriented to person, place, and time. She appears well-developed and well-nourished.   HENT:   Head: Normocephalic and atraumatic.   Eyes: Pupils are equal, round, and reactive to light.   Neck: Neck supple. No JVD present. No thyromegaly present.   Cardiovascular: Normal rate, regular rhythm, normal heart sounds and intact distal pulses. Exam reveals no gallop and no friction rub.   No murmur heard.  Bilateral " nonpitting edema   Pulmonary/Chest: Effort normal and breath sounds normal. No stridor. No respiratory distress. She has no wheezes. She has no rales. She exhibits no tenderness.   Musculoskeletal: She exhibits no edema, tenderness or deformity.   Neurological: She is alert and oriented to person, place, and time. No cranial nerve deficit. Coordination normal.   Skin: Skin is warm and dry.   Psychiatric: She has a normal mood and affect.   Vitals reviewed.      Procedure   Procedures       Assessment/Plan    Diagnosis Plan   1. Shortness of breath     2. Palpitations     3. VHD (valvular heart disease)     4. Bilateral leg edema     5. Chronic fatigue     6. Other tobacco product nicotine dependence with other nicotine-induced disorder     1. Better, advised to continue dieting and losing weight, she is followed by Dr Hogue, he wants to start Adipex, that should be OK, will repeat echocardiogram to assess valvular regurgitation  2. No further palpitations  3. Advised to use elastic stocking  4. Advised to quit chewing    No follow-ups on file.    Kendra was seen today for establish care, palpitations and rapid heart rate.    Diagnoses and all orders for this visit:    Shortness of breath    Palpitations    VHD (valvular heart disease)    Bilateral leg edema    Chronic fatigue    Other tobacco product nicotine dependence with other nicotine-induced disorder                 MEDS ORDERED DURING VISIT:  No orders of the defined types were placed in this encounter.        This document has been electronically signed by Maribeth Tate MD  June 26, 2020 14:00

## 2020-06-29 DIAGNOSIS — R06.02 SHORTNESS OF BREATH: ICD-10-CM

## 2020-06-29 DIAGNOSIS — R00.2 PALPITATIONS: ICD-10-CM

## 2020-06-29 RX ORDER — METOPROLOL SUCCINATE 50 MG/1
50 TABLET, EXTENDED RELEASE ORAL DAILY
Qty: 30 TABLET | Refills: 11 | Status: SHIPPED | OUTPATIENT
Start: 2020-06-29 | End: 2020-09-21 | Stop reason: SDUPTHER

## 2020-06-29 RX ORDER — SPIRONOLACTONE 25 MG/1
25 TABLET ORAL DAILY
Qty: 90 TABLET | Refills: 1 | Status: SHIPPED | OUTPATIENT
Start: 2020-06-29 | End: 2020-09-21 | Stop reason: SDUPTHER

## 2020-06-29 RX ORDER — BUMETANIDE 1 MG/1
1 TABLET ORAL 2 TIMES DAILY
Qty: 180 TABLET | Refills: 1 | Status: SHIPPED | OUTPATIENT
Start: 2020-06-29 | End: 2020-09-21 | Stop reason: SDUPTHER

## 2020-07-20 ENCOUNTER — HOSPITAL ENCOUNTER (OUTPATIENT)
Dept: CARDIOLOGY | Facility: HOSPITAL | Age: 35
Discharge: HOME | End: 2020-07-20
Admitting: SPECIALIST

## 2020-07-20 VITALS — HEIGHT: 62 IN | BODY MASS INDEX: 45.03 KG/M2 | WEIGHT: 244.71 LBS

## 2020-07-20 DIAGNOSIS — I38 VHD (VALVULAR HEART DISEASE): ICD-10-CM

## 2020-07-20 LAB
AORTIC DIMENSIONLESS INDEX: 0.8 (DI)
BH CV ECHO MEAS - AO MAX PG (FULL): 2.8 MMHG
BH CV ECHO MEAS - AO MAX PG: 6.4 MMHG
BH CV ECHO MEAS - AO MEAN PG (FULL): 1 MMHG
BH CV ECHO MEAS - AO MEAN PG: 3 MMHG
BH CV ECHO MEAS - AO ROOT AREA (BSA CORRECTED): 1.4
BH CV ECHO MEAS - AO ROOT AREA: 6.6 CM^2
BH CV ECHO MEAS - AO ROOT DIAM: 2.9 CM
BH CV ECHO MEAS - AO V2 MAX: 126 CM/SEC
BH CV ECHO MEAS - AO V2 MEAN: 86.1 CM/SEC
BH CV ECHO MEAS - AO V2 VTI: 28.9 CM
BH CV ECHO MEAS - BSA(HAYCOCK): 2.3 M^2
BH CV ECHO MEAS - BSA: 2.1 M^2
BH CV ECHO MEAS - BZI_BMI: 44.6 KILOGRAMS/M^2
BH CV ECHO MEAS - BZI_METRIC_HEIGHT: 157.5 CM
BH CV ECHO MEAS - BZI_METRIC_WEIGHT: 110.7 KG
BH CV ECHO MEAS - EDV(CUBED): 69.9 ML
BH CV ECHO MEAS - EDV(TEICH): 75.1 ML
BH CV ECHO MEAS - EF(CUBED): 73.1 %
BH CV ECHO MEAS - EF(TEICH): 65.3 %
BH CV ECHO MEAS - ESV(CUBED): 18.8 ML
BH CV ECHO MEAS - ESV(TEICH): 26 ML
BH CV ECHO MEAS - FS: 35.4 %
BH CV ECHO MEAS - IVS/LVPW: 1.1
BH CV ECHO MEAS - IVSD: 1.1 CM
BH CV ECHO MEAS - LA DIMENSION: 3.5 CM
BH CV ECHO MEAS - LA/AO: 1.2
BH CV ECHO MEAS - LAT PEAK E' VEL: 13.9 CM/SEC
BH CV ECHO MEAS - LV IVRT: 0.1 SEC
BH CV ECHO MEAS - LV MASS(C)D: 140.2 GRAMS
BH CV ECHO MEAS - LV MASS(C)DI: 67.4 GRAMS/M^2
BH CV ECHO MEAS - LV MAX PG: 3.5 MMHG
BH CV ECHO MEAS - LV MEAN PG: 2 MMHG
BH CV ECHO MEAS - LV V1 MAX: 94 CM/SEC
BH CV ECHO MEAS - LV V1 MEAN: 56.3 CM/SEC
BH CV ECHO MEAS - LV V1 VTI: 17.8 CM
BH CV ECHO MEAS - LVIDD: 4.1 CM
BH CV ECHO MEAS - LVIDS: 2.7 CM
BH CV ECHO MEAS - LVPWD: 0.98 CM
BH CV ECHO MEAS - MED PEAK E' VEL: 12.4 CM/SEC
BH CV ECHO MEAS - MV A MAX VEL: 64.3 CM/SEC
BH CV ECHO MEAS - MV DEC SLOPE: 475 CM/SEC^2
BH CV ECHO MEAS - MV DEC TIME: 0.17 SEC
BH CV ECHO MEAS - MV E MAX VEL: 95.4 CM/SEC
BH CV ECHO MEAS - MV E/A: 1.5
BH CV ECHO MEAS - MV MAX PG: 4.5 MMHG
BH CV ECHO MEAS - MV MEAN PG: 1 MMHG
BH CV ECHO MEAS - MV P1/2T MAX VEL: 114 CM/SEC
BH CV ECHO MEAS - MV P1/2T: 70.3 MSEC
BH CV ECHO MEAS - MV V2 MAX: 106 CM/SEC
BH CV ECHO MEAS - MV V2 MEAN: 51.9 CM/SEC
BH CV ECHO MEAS - MV V2 VTI: 32.4 CM
BH CV ECHO MEAS - MVA P1/2T LCG: 1.9 CM^2
BH CV ECHO MEAS - MVA(P1/2T): 3.1 CM^2
BH CV ECHO MEAS - PA MAX PG (FULL): 0.9 MMHG
BH CV ECHO MEAS - PA MAX PG: 4.8 MMHG
BH CV ECHO MEAS - PA MEAN PG (FULL): 0 MMHG
BH CV ECHO MEAS - PA MEAN PG: 2 MMHG
BH CV ECHO MEAS - PA V2 MAX: 110 CM/SEC
BH CV ECHO MEAS - PA V2 MEAN: 73.5 CM/SEC
BH CV ECHO MEAS - PA V2 VTI: 26.3 CM
BH CV ECHO MEAS - RAP SYSTOLE: 10 MMHG
BH CV ECHO MEAS - RV MAX PG: 3.9 MMHG
BH CV ECHO MEAS - RV MEAN PG: 2 MMHG
BH CV ECHO MEAS - RV V1 MAX: 99.2 CM/SEC
BH CV ECHO MEAS - RV V1 MEAN: 54.6 CM/SEC
BH CV ECHO MEAS - RV V1 VTI: 22.4 CM
BH CV ECHO MEAS - RVDD: 2.3 CM
BH CV ECHO MEAS - RVSP: 16 MMHG
BH CV ECHO MEAS - SI(AO): 91.8 ML/M^2
BH CV ECHO MEAS - SI(CUBED): 24.6 ML/M^2
BH CV ECHO MEAS - SI(TEICH): 23.6 ML/M^2
BH CV ECHO MEAS - SV(AO): 190.9 ML
BH CV ECHO MEAS - SV(CUBED): 51.1 ML
BH CV ECHO MEAS - SV(TEICH): 49 ML
BH CV ECHO MEAS - TR MAX VEL: 122 CM/SEC
BH CV ECHO MEASUREMENTS AVERAGE E/E' RATIO: 7.25
MAXIMAL PREDICTED HEART RATE: 185 BPM
STRESS TARGET HR: 157 BPM

## 2020-07-20 PROCEDURE — 93306 TTE W/DOPPLER COMPLETE: CPT | Performed by: SPECIALIST

## 2020-07-20 PROCEDURE — 93306 TTE W/DOPPLER COMPLETE: CPT

## 2020-09-21 ENCOUNTER — OFFICE VISIT (OUTPATIENT)
Dept: CARDIOLOGY | Facility: CLINIC | Age: 35
End: 2020-09-21

## 2020-09-21 VITALS
SYSTOLIC BLOOD PRESSURE: 124 MMHG | OXYGEN SATURATION: 98 % | HEIGHT: 62 IN | WEIGHT: 242 LBS | DIASTOLIC BLOOD PRESSURE: 82 MMHG | HEART RATE: 92 BPM | BODY MASS INDEX: 44.53 KG/M2 | TEMPERATURE: 97.3 F

## 2020-09-21 DIAGNOSIS — E66.01 MORBID OBESITY WITH BMI OF 40.0-44.9, ADULT (HCC): ICD-10-CM

## 2020-09-21 DIAGNOSIS — R53.82 CHRONIC FATIGUE: ICD-10-CM

## 2020-09-21 DIAGNOSIS — F17.298 OTHER TOBACCO PRODUCT NICOTINE DEPENDENCE WITH OTHER NICOTINE-INDUCED DISORDER: ICD-10-CM

## 2020-09-21 DIAGNOSIS — R60.0 BILATERAL LEG EDEMA: ICD-10-CM

## 2020-09-21 DIAGNOSIS — R00.2 PALPITATIONS: ICD-10-CM

## 2020-09-21 DIAGNOSIS — I38 VHD (VALVULAR HEART DISEASE): ICD-10-CM

## 2020-09-21 DIAGNOSIS — R06.02 SHORTNESS OF BREATH: Primary | ICD-10-CM

## 2020-09-21 PROCEDURE — 99213 OFFICE O/P EST LOW 20 MIN: CPT | Performed by: SPECIALIST

## 2020-09-21 RX ORDER — BUMETANIDE 1 MG/1
1 TABLET ORAL 2 TIMES DAILY
Qty: 180 TABLET | Refills: 1 | Status: SHIPPED | OUTPATIENT
Start: 2020-09-21 | End: 2021-03-24 | Stop reason: SDUPTHER

## 2020-09-21 RX ORDER — LEVOCETIRIZINE DIHYDROCHLORIDE 5 MG/1
TABLET, FILM COATED ORAL DAILY
COMMUNITY
Start: 2020-08-29

## 2020-09-21 RX ORDER — METHOCARBAMOL 750 MG/1
TABLET, FILM COATED ORAL 3 TIMES DAILY PRN
COMMUNITY
Start: 2020-08-24

## 2020-09-21 RX ORDER — METOPROLOL SUCCINATE 50 MG/1
50 TABLET, EXTENDED RELEASE ORAL DAILY
Qty: 30 TABLET | Refills: 11 | Status: SHIPPED | OUTPATIENT
Start: 2020-09-21 | End: 2021-03-24 | Stop reason: SDUPTHER

## 2020-09-21 RX ORDER — LEVOTHYROXINE SODIUM 0.05 MG/1
TABLET ORAL DAILY
COMMUNITY
Start: 2020-08-24 | End: 2022-08-10 | Stop reason: DRUGHIGH

## 2020-09-21 RX ORDER — FAMOTIDINE 20 MG/1
TABLET, FILM COATED ORAL 2 TIMES DAILY
COMMUNITY
Start: 2020-08-27

## 2020-09-21 RX ORDER — MELOXICAM 7.5 MG/1
TABLET ORAL DAILY
COMMUNITY
Start: 2020-08-24

## 2020-09-21 RX ORDER — SPIRONOLACTONE 25 MG/1
25 TABLET ORAL DAILY
Qty: 90 TABLET | Refills: 1 | Status: SHIPPED | OUTPATIENT
Start: 2020-09-21 | End: 2021-03-24

## 2020-09-21 NOTE — PATIENT INSTRUCTIONS
Obesity, Adult  Obesity is the condition of having too much total body fat. Being overweight or obese means that your weight is greater than what is considered healthy for your body size. Obesity is determined by a measurement called BMI. BMI is an estimate of body fat and is calculated from height and weight. For adults, a BMI of 30 or higher is considered obese.  Obesity can lead to other health concerns and major illnesses, including:  · Stroke.  · Coronary artery disease (CAD).  · Type 2 diabetes.  · Some types of cancer, including cancers of the colon, breast, uterus, and gallbladder.  · Osteoarthritis.  · High blood pressure (hypertension).  · High cholesterol.  · Sleep apnea.  · Gallbladder stones.  · Infertility problems.  What are the causes?  Common causes of this condition include:  · Eating daily meals that are high in calories, sugar, and fat.  · Being born with genes that may make you more likely to become obese.  · Having a medical condition that causes obesity, including:  ? Hypothyroidism.  ? Polycystic ovarian syndrome (PCOS).  ? Binge-eating disorder.  ? Cushing syndrome.  · Taking certain medicines, such as steroids, antidepressants, and seizure medicines.  · Not being physically active (sedentary lifestyle).  · Not getting enough sleep.  · Drinking high amounts of sugar-sweetened beverages, such as soft drinks.  What increases the risk?  The following factors may make you more likely to develop this condition:  · Having a family history of obesity.  · Being a woman of  descent.  · Being a man of  descent.  · Living in an area with limited access to:  ? Lraes, recreation centers, or sidewalks.  ? Healthy food choices, such as grocery stores and farmers' markets.  What are the signs or symptoms?  The main sign of this condition is having too much body fat.  How is this diagnosed?  This condition is diagnosed based on:  · Your BMI. If you are an adult with a BMI of 30 or  higher, you are considered obese.  · Your waist circumference. This measures the distance around your waistline.  · Your skinfold thickness. Your health care provider may gently pinch a fold of your skin and measure it.  You may have other tests to check for underlying conditions.  How is this treated?  Treatment for this condition often includes changing your lifestyle. Treatment may include some or all of the following:  · Dietary changes. This may include developing a healthy meal plan.  · Regular physical activity. This may include activity that causes your heart to beat faster (aerobic exercise) and strength training. Work with your health care provider to design an exercise program that works for you.  · Medicine to help you lose weight if you are unable to lose 1 pound a week after 6 weeks of healthy eating and more physical activity.  · Treating conditions that cause the obesity (underlying conditions).  · Surgery. Surgical options may include gastric banding and gastric bypass. Surgery may be done if:  ? Other treatments have not helped to improve your condition.  ? You have a BMI of 40 or higher.  ? You have life-threatening health problems related to obesity.  Follow these instructions at home:  Eating and drinking    · Follow recommendations from your health care provider about what you eat and drink. Your health care provider may advise you to:  ? Limit fast food, sweets, and processed snack foods.  ? Choose low-fat options, such as low-fat milk instead of whole milk.  ? Eat 5 or more servings of fruits or vegetables every day.  ? Eat at home more often. This gives you more control over what you eat.  ? Choose healthy foods when you eat out.  ? Learn to read food labels. This will help you understand how much food is considered 1 serving.  ? Learn what a healthy serving size is.  ? Keep low-fat snacks available.  ? Limit sugary drinks, such as soda, fruit juice, sweetened iced tea, and flavored  milk.  · Drink enough water to keep your urine pale yellow.  · Do not follow a fad diet. Fad diets can be unhealthy and even dangerous.  Physical activity  · Exercise regularly, as told by your health care provider.  ? Most adults should get up to 150 minutes of moderate-intensity exercise every week.  ? Ask your health care provider what types of exercise are safe for you and how often you should exercise.  · Warm up and stretch before being active.  · Cool down and stretch after being active.  · Rest between periods of activity.  Lifestyle  · Work with your health care provider and a dietitian to set a weight-loss goal that is healthy and reasonable for you.  · Limit your screen time.  · Find ways to reward yourself that do not involve food.  · Do not drink alcohol if:  ? Your health care provider tells you not to drink.  ? You are pregnant, may be pregnant, or are planning to become pregnant.  · If you drink alcohol:  ? Limit how much you use to:  § 0-1 drink a day for women.  § 0-2 drinks a day for men.  ? Be aware of how much alcohol is in your drink. In the [place] one drink equals one 12 oz bottle of beer (355 mL), one 5 oz glass of wine (148 mL), or one 1½ oz glass of hard liquor (44 mL).  General instructions  · Keep a weight-loss journal to keep track of the food you eat and how much exercise you get.  · Take over-the-counter and prescription medicines only as told by your health care provider.  · Take vitamins and supplements only as told by your health care provider.  · Consider joining a support group. Your health care provider may be able to recommend a support group.  · Keep all follow-up visits as told by your health care provider. This is important.  Contact a health care provider if:  · You are unable to meet your weight loss goal after 6 weeks of dietary and lifestyle changes.  Get help right away if you are having:  · Trouble breathing.  · Suicidal thoughts or behaviors.  Summary  · Obesity is the  condition of having too much total body fat.  · Being overweight or obese means that your weight is greater than what is considered healthy for your body size.  · Work with your health care provider and a dietitian to set a weight-loss goal that is healthy and reasonable for you.  · Exercise regularly, as told by your health care provider. Ask your health care provider what types of exercise are safe for you and how often you should exercise.  This information is not intended to replace advice given to you by your health care provider. Make sure you discuss any questions you have with your health care provider.  Document Released: 01/25/2006 Document Revised: 08/22/2019 Document Reviewed: 08/22/2019  [employer] Patient Education © 2020 [employer]  MyPlate from [employer]    MyPlate is an outline of a general healthy diet based on the 2010 Dietary Guidelines for Americans, from the [place] Department of Agriculture (USDA). It sets guidelines for how much food you should eat from each food group based on your age, sex, and level of physical activity.  What are tips for following MyPlate?  To follow MyPlate recommendations:  · Eat a wide variety of fruits and vegetables, grains, and protein foods.  · Serve smaller portions and eat less food throughout the day.  · Limit portion sizes to avoid overeating.  · Enjoy your food.  · Get at least 150 minutes of exercise every week. This is about 30 minutes each day, 5 or more days per week.  It can be difficult to have every meal look like MyPlate. Think about MyPlate as eating guidelines for an entire day, rather than each individual meal.  Fruits and vegetables  · Make half of your plate fruits and vegetables.  · Eat many different colors of fruits and vegetables each day.  · For a 2,000 calorie daily food plan, eat:  ? 2½ cups of vegetables every day.  ? 2 cups of fruit every day.  · 1 cup is equal to:  ? 1 cup raw or cooked vegetables.  ? 1 cup raw fruit.  ? 1 medium-sized orange,  apple, or banana.  ? 1 cup 100% fruit or vegetable juice.  ? 2 cups raw leafy greens, such as lettuce, spinach, or kale.  ? ½ cup dried fruit.  Grains  · One fourth of your plate should be grains.  · Make at least half of the grains you eat each day whole grains.  · For a 2,000 calorie daily food plan, eat 6 oz of grains every day.  · 1 oz is equal to:  ? 1 slice bread.  ? 1 cup cereal.  ? ½ cup cooked rice, cereal, or pasta.  Protein  · One fourth of your plate should be protein.  · Eat a wide variety of protein foods, including meat, poultry, fish, eggs, beans, nuts, and tofu.  · For a 2,000 calorie daily food plan, eat 5½ oz of protein every day.  · 1 oz is equal to:  ? 1 oz meat, poultry, or fish.  ? ¼ cup cooked beans.  ? 1 egg.  ? ½ oz nuts or seeds.  ? 1 Tbsp peanut butter.  Dairy  · Drink fat-free or low-fat (1%) milk.  · Eat or drink dairy as a side to meals.  · For a 2,000 calorie daily food plan, eat or drink 3 cups of dairy every day.  · 1 cup is equal to:  ? 1 cup milk, yogurt, cottage cheese, or soy milk (soy beverage).  ? 2 oz processed cheese.  ? 1½ oz natural cheese.  Fats, oils, salt, and sugars  · Only small amounts of oils are recommended.  · Avoid foods that are high in calories and low in nutritional value (empty calories), like foods high in fat or added sugars.  · Choose foods that are low in salt (sodium). Choose foods that have less than 140 milligrams (mg) of sodium per serving.  · Drink water instead of sugary drinks. Drink enough water each day to keep your urine pale yellow.  Where to find support  · Work with your health care provider or a nutrition specialist (dietitian) to develop a customized eating plan that is right for you.  · Download an yancy (mobile application) to help you track your daily food intake.  Where to find more information  · Go to ChooseMyPlate.gov for more information.  Summary  · MyPlate is a general guideline for healthy eating from the USDA. It is based on the  2010 Dietary Guidelines for Americans.  · In general, fruits and vegetables should take up ½ of your plate, grains should take up ¼ of your plate, and protein should take up ¼ of your plate.  This information is not intended to replace advice given to you by your health care provider. Make sure you discuss any questions you have with your health care provider.  Document Released: 01/06/2009 Document Revised: 05/21/2020 Document Reviewed: 03/19/2018  [employer] Patient Education © 2020 [employer]

## 2020-09-21 NOTE — PROGRESS NOTES
Subjective   Follow up, echocardiogram result  Kendra Chaney is a 35 y.o. female who presents to day for Follow-up (Here for a 3 month follow up ), Palpitations, and Rapid Heart Rate.    CHIEF COMPLIANT  Chief Complaint   Patient presents with   • Follow-up     Here for a 3 month follow up    • Palpitations   • Rapid Heart Rate       Problem list:  1. Chest Pain  2. Shortness of breath  3. Palpitations  4. Mitral Regurg.  5. Old MI  6. [place]  7. COPD  8. BLE edema  10. Lupus  11. Past Drug use  12. Former smoker  13. Echo, 7-, EF 66-70%, mild MR        Active Problems:  Problem List Items Addressed This Visit        Cardiovascular and Mediastinum    VHD (valvular heart disease)    Overview     Echo 12/12 EF 60% mild MR, trace TR         Palpitations       Respiratory    Shortness of breath - Primary       Digestive    Morbid obesity with BMI of 40.0-44.9, adult (CMS/[facility])       Other    Fatigue    Nicotine dependence    Bilateral leg edema          HPI    Has environmental allergies, with stable shortness of breath on moderate exertion, rare palpitation, and intermittent edema, no chest pain, still chews tobacco                      PRIOR MEDS  Current Outpatient Medications on File Prior to Visit   Medication Sig Dispense Refill   • aspirin 81 MG EC tablet Take 81 mg by mouth Daily.     • bumetanide (BUMEX) 1 MG tablet Take 1 tablet by mouth 2 (Two) Times a Day. 180 tablet 1   • busPIRone (BUSPAR) 15 MG tablet Take 15 mg by mouth 2 (Two) Times a Day.     • cetirizine (zyrTEC) 10 MG tablet Take 10 mg by mouth Daily.     • dicyclomine (BENTYL) 20 MG tablet Take 20 mg by mouth Every 6 (Six) Hours.     • DULoxetine (CYMBALTA) 60 MG capsule Take 60 mg by mouth Daily.     • famotidine (PEPCID) 20 MG tablet 2 (Two) Times a Day.     • HYDROcodone-acetaminophen (NORCO) 5-325 MG per tablet Take 1 tablet by mouth Every 6 (Six) Hours As Needed.     • hydrOXYzine pamoate (VISTARIL) 50 MG capsule Every Night.     •  levocetirizine (XYZAL) 5 MG tablet Daily.     • levothyroxine (SYNTHROID, LEVOTHROID) 50 MCG tablet Daily.     • meloxicam (MOBIC) 7.5 MG tablet Daily.     • methocarbamol (ROBAXIN) 750 MG tablet 3 (Three) Times a Day As Needed.     • metoprolol succinate XL (TOPROL-XL) 50 MG 24 hr tablet Take 1 tablet by mouth Daily. 30 tablet 11   • montelukast (SINGULAIR) 10 MG tablet Take 10 mg by mouth Daily.     • omeprazole (priLOSEC) 20 MG capsule Take 20 mg by mouth Daily.     • ondansetron (ZOFRAN) 4 MG tablet Take 4 mg by mouth Every 8 (Eight) Hours As Needed for nausea or vomiting.     • spironolactone (ALDACTONE) 25 MG tablet Take 1 tablet by mouth Daily. 90 tablet 1   • vitamin D (ERGOCALCIFEROL) 1.25 MG (52947 UT) capsule capsule 1 (One) Time Per Week.     • [DISCONTINUED] levothyroxine (SYNTHROID, LEVOTHROID) 25 MCG tablet Daily.       No current facility-administered medications on file prior to visit.        ALLERGIES  Ibuprofen, Amoxicillin, Contrast dye, Folic acid, Garlic, Iodinated diagnostic agents, Iron, Penicillins, Phenergan [promethazine hcl], Prenatal complete, Risperidone, and Seroquel [quetiapine]    HISTORY  Past Medical History:   Diagnosis Date   • Asthma    • Chest pain    • COPD (chronic obstructive pulmonary disease) (CMS/HCC)    • Drug use    • Edema    • H/O  section    • History of appendectomy    • History of ear surgery     right   • Lupus (CMS/HCC)    • Old MI (myocardial infarction)    • Palpitation    • Seizure (CMS/HCC)     Pt states only related to taking ibuprofen   • Shortness of breath        Social History     Socioeconomic History   • Marital status: Unknown     Spouse name: Not on file   • Number of children: Not on file   • Years of education: Not on file   • Highest education level: Not on file   Tobacco Use   • Smoking status: Former Smoker     Types: Cigarettes     Quit date:      Years since quittin.7   • Smokeless tobacco: Current User     Types: Snuff  "  Substance and Sexual Activity   • Alcohol use: No     Alcohol/week: 7.0 standard drinks     Types: 7 Cans of beer per week     Frequency: Never   • Drug use: Yes     Types: Marijuana, Methamphetamines     Comment: in past   • Sexual activity: Defer       Family History   Problem Relation Age of Onset   • Heart disease Father    • Hypertension Father    • COPD Father    • Diabetes Father    • Alzheimer's disease Father        Review of Systems   Constitutional: Positive for fatigue. Negative for activity change and appetite change.   HENT: Positive for congestion (nasal) and postnasal drip.    Eyes: Positive for itching and visual disturbance (contact x 1).   Respiratory: Positive for shortness of breath. Negative for apnea, cough, choking, chest tightness, wheezing and stridor.    Cardiovascular: Positive for palpitations and leg swelling. Negative for chest pain.   Gastrointestinal: Positive for constipation. Negative for abdominal distention and abdominal pain.   Endocrine: Negative.  Negative for cold intolerance and heat intolerance.   Genitourinary: Negative.  Negative for flank pain.   Musculoskeletal: Positive for arthralgias and myalgias.   Skin: Negative.  Negative for color change and pallor.   Allergic/Immunologic: Positive for environmental allergies.   Neurological: Positive for dizziness, syncope and light-headedness.   Hematological: Bruises/bleeds easily.   Psychiatric/Behavioral: Positive for sleep disturbance. Negative for agitation and confusion.       Objective     VITALS: /82 (BP Location: Left arm, Patient Position: Sitting)   Pulse 92   Temp 97.3 °F (36.3 °C)   Ht 157.5 cm (62\")   Wt 110 kg (242 lb)   SpO2 98%   BMI 44.26 kg/m²     LABS:   Lab Results (most recent)     None          IMAGING:   No Images in the past 120 days found..    EXAM:  Physical Exam  Vitals signs reviewed.   Constitutional:       Appearance: She is well-developed.   HENT:      Head: Normocephalic and " atraumatic.   Eyes:      Pupils: Pupils are equal, round, and reactive to light.   Neck:      Musculoskeletal: Neck supple.      Thyroid: No thyromegaly.      Vascular: No JVD.   Cardiovascular:      Rate and Rhythm: Normal rate and regular rhythm.      Heart sounds: Normal heart sounds. No murmur. No friction rub. No gallop.    Pulmonary:      Effort: Pulmonary effort is normal. No respiratory distress.      Breath sounds: Normal breath sounds. No stridor. No wheezing or rales.   Chest:      Chest wall: No tenderness.   Musculoskeletal:         General: No tenderness or deformity.   Skin:     General: Skin is warm and dry.   Neurological:      Mental Status: She is alert and oriented to person, place, and time.      Cranial Nerves: No cranial nerve deficit.      Coordination: Coordination normal.         Procedure   Procedures       Assessment/Plan    Diagnosis Plan   1. Shortness of breath     2. Palpitations     3. VHD (valvular heart disease)     4. Bilateral leg edema     5. Chronic fatigue     6. Other tobacco product nicotine dependence with other nicotine-induced disorder     7. Morbid obesity with BMI of 40.0-44.9, adult (CMS/HCC)     1. I discussed echocardiogram result with patient, advised to lose weight  2. Rare palpitations, advised to avoid caffeine products  3. Advised to quit chewing  4. I reviewed labs, with low HDL, vit D3, I discussed result with patient, diet and exercise discussed  5. Again, advised to lose weight    No follow-ups on file.               MEDS ORDERED DURING VISIT:  No orders of the defined types were placed in this encounter.        This document has been electronically signed by Maribeth Tate MD  September 21, 2020 15:41 EDT

## 2021-02-03 ENCOUNTER — LAB (OUTPATIENT)
Dept: LAB | Facility: HOSPITAL | Age: 36
End: 2021-02-03

## 2021-02-03 DIAGNOSIS — E66.01 MORBID OBESITY WITH BMI OF 40.0-44.9, ADULT (HCC): ICD-10-CM

## 2021-02-03 LAB
ALBUMIN SERPL-MCNC: 4.14 G/DL (ref 3.5–5.2)
ALBUMIN/GLOB SERPL: 1.2 G/DL
ALP SERPL-CCNC: 59 U/L (ref 39–117)
ALT SERPL W P-5'-P-CCNC: 13 U/L (ref 1–33)
ANION GAP SERPL CALCULATED.3IONS-SCNC: 10.7 MMOL/L (ref 5–15)
AST SERPL-CCNC: 12 U/L (ref 1–32)
BILIRUB SERPL-MCNC: 0.4 MG/DL (ref 0–1.2)
BUN SERPL-MCNC: 10 MG/DL (ref 6–20)
BUN/CREAT SERPL: 11.8 (ref 7–25)
CALCIUM SPEC-SCNC: 9.4 MG/DL (ref 8.6–10.5)
CHLORIDE SERPL-SCNC: 101 MMOL/L (ref 98–107)
CHOLEST SERPL-MCNC: 137 MG/DL (ref 0–200)
CO2 SERPL-SCNC: 22.3 MMOL/L (ref 22–29)
CREAT SERPL-MCNC: 0.85 MG/DL (ref 0.57–1)
GFR SERPL CREATININE-BSD FRML MDRD: 76 ML/MIN/1.73
GFR SERPL CREATININE-BSD FRML MDRD: 92 ML/MIN/1.73
GLOBULIN UR ELPH-MCNC: 3.5 GM/DL
GLUCOSE SERPL-MCNC: 94 MG/DL (ref 65–99)
HDLC SERPL-MCNC: 45 MG/DL (ref 40–60)
LDLC SERPL CALC-MCNC: 73 MG/DL (ref 0–100)
LDLC/HDLC SERPL: 1.59 {RATIO}
POTASSIUM SERPL-SCNC: 4 MMOL/L (ref 3.5–5.2)
PROT SERPL-MCNC: 7.6 G/DL (ref 6–8.5)
SODIUM SERPL-SCNC: 134 MMOL/L (ref 136–145)
TRIGL SERPL-MCNC: 102 MG/DL (ref 0–150)
VLDLC SERPL-MCNC: 19 MG/DL (ref 5–40)

## 2021-02-03 PROCEDURE — 80061 LIPID PANEL: CPT

## 2021-02-03 PROCEDURE — 36415 COLL VENOUS BLD VENIPUNCTURE: CPT

## 2021-02-03 PROCEDURE — 80053 COMPREHEN METABOLIC PANEL: CPT

## 2021-03-24 ENCOUNTER — OFFICE VISIT (OUTPATIENT)
Dept: CARDIOLOGY | Facility: CLINIC | Age: 36
End: 2021-03-24

## 2021-03-24 VITALS
BODY MASS INDEX: 46.7 KG/M2 | DIASTOLIC BLOOD PRESSURE: 80 MMHG | HEIGHT: 62 IN | SYSTOLIC BLOOD PRESSURE: 126 MMHG | HEART RATE: 90 BPM | OXYGEN SATURATION: 99 % | WEIGHT: 253.8 LBS

## 2021-03-24 DIAGNOSIS — R06.02 SHORTNESS OF BREATH: Primary | ICD-10-CM

## 2021-03-24 DIAGNOSIS — R53.82 CHRONIC FATIGUE: ICD-10-CM

## 2021-03-24 DIAGNOSIS — E66.01 MORBID OBESITY WITH BMI OF 40.0-44.9, ADULT (HCC): ICD-10-CM

## 2021-03-24 DIAGNOSIS — I38 VHD (VALVULAR HEART DISEASE): ICD-10-CM

## 2021-03-24 DIAGNOSIS — R00.2 PALPITATIONS: ICD-10-CM

## 2021-03-24 DIAGNOSIS — R60.0 BILATERAL LEG EDEMA: ICD-10-CM

## 2021-03-24 DIAGNOSIS — F17.298 OTHER TOBACCO PRODUCT NICOTINE DEPENDENCE WITH OTHER NICOTINE-INDUCED DISORDER: ICD-10-CM

## 2021-03-24 PROCEDURE — 99214 OFFICE O/P EST MOD 30 MIN: CPT | Performed by: SPECIALIST

## 2021-03-24 RX ORDER — POTASSIUM CITRATE 10 MEQ/1
10 TABLET, EXTENDED RELEASE ORAL DAILY
Qty: 90 TABLET | Refills: 1 | Status: SHIPPED | OUTPATIENT
Start: 2021-03-24 | End: 2021-07-07 | Stop reason: SDUPTHER

## 2021-03-24 RX ORDER — MIRTAZAPINE 15 MG/1
TABLET, FILM COATED ORAL NIGHTLY
COMMUNITY
Start: 2021-01-29

## 2021-03-24 RX ORDER — POTASSIUM CITRATE 10 MEQ/1
TABLET, EXTENDED RELEASE ORAL DAILY
COMMUNITY
End: 2021-03-24 | Stop reason: SDUPTHER

## 2021-03-24 RX ORDER — METOPROLOL SUCCINATE 50 MG/1
50 TABLET, EXTENDED RELEASE ORAL DAILY
Qty: 30 TABLET | Refills: 11 | Status: SHIPPED | OUTPATIENT
Start: 2021-03-24 | End: 2021-07-07 | Stop reason: SDUPTHER

## 2021-03-24 RX ORDER — BUMETANIDE 1 MG/1
1 TABLET ORAL 2 TIMES DAILY
Qty: 180 TABLET | Refills: 1 | Status: SHIPPED | OUTPATIENT
Start: 2021-03-24 | End: 2021-07-07 | Stop reason: SDUPTHER

## 2021-03-24 RX ORDER — ASPIRIN 81 MG/1
81 TABLET ORAL DAILY
Qty: 90 TABLET | Refills: 1 | Status: SHIPPED | OUTPATIENT
Start: 2021-03-24 | End: 2021-10-08

## 2021-03-24 RX ORDER — SPIRONOLACTONE 25 MG/1
25 TABLET ORAL DAILY
Qty: 90 TABLET | Refills: 1 | Status: SHIPPED | OUTPATIENT
Start: 2021-03-24 | End: 2021-07-07 | Stop reason: SDUPTHER

## 2021-03-24 NOTE — PATIENT INSTRUCTIONS
Obesity, Adult  Obesity is the condition of having too much total body fat. Being overweight or obese means that your weight is greater than what is considered healthy for your body size. Obesity is determined by a measurement called BMI. BMI is an estimate of body fat and is calculated from height and weight. For adults, a BMI of 30 or higher is considered obese.  Obesity can lead to other health concerns and major illnesses, including:  · Stroke.  · Coronary artery disease (CAD).  · Type 2 diabetes.  · Some types of cancer, including cancers of the colon, breast, uterus, and gallbladder.  · Osteoarthritis.  · High blood pressure (hypertension).  · High cholesterol.  · Sleep apnea.  · Gallbladder stones.  · Infertility problems.  What are the causes?  Common causes of this condition include:  · Eating daily meals that are high in calories, sugar, and fat.  · Being born with genes that may make you more likely to become obese.  · Having a medical condition that causes obesity, including:  ? Hypothyroidism.  ? Polycystic ovarian syndrome (PCOS).  ? Binge-eating disorder.  ? Cushing syndrome.  · Taking certain medicines, such as steroids, antidepressants, and seizure medicines.  · Not being physically active (sedentary lifestyle).  · Not getting enough sleep.  · Drinking high amounts of sugar-sweetened beverages, such as soft drinks.  What increases the risk?  The following factors may make you more likely to develop this condition:  · Having a family history of obesity.  · Being a woman of  descent.  · Being a man of  descent.  · Living in an area with limited access to:  ? Lares, recreation centers, or sidewalks.  ? Healthy food choices, such as grocery stores and farmers' markets.  What are the signs or symptoms?  The main sign of this condition is having too much body fat.  How is this diagnosed?  This condition is diagnosed based on:  · Your BMI. If you are an adult with a BMI of 30 or  higher, you are considered obese.  · Your waist circumference. This measures the distance around your waistline.  · Your skinfold thickness. Your health care provider may gently pinch a fold of your skin and measure it.  You may have other tests to check for underlying conditions.  How is this treated?  Treatment for this condition often includes changing your lifestyle. Treatment may include some or all of the following:  · Dietary changes. This may include developing a healthy meal plan.  · Regular physical activity. This may include activity that causes your heart to beat faster (aerobic exercise) and strength training. Work with your health care provider to design an exercise program that works for you.  · Medicine to help you lose weight if you are unable to lose 1 pound a week after 6 weeks of healthy eating and more physical activity.  · Treating conditions that cause the obesity (underlying conditions).  · Surgery. Surgical options may include gastric banding and gastric bypass. Surgery may be done if:  ? Other treatments have not helped to improve your condition.  ? You have a BMI of 40 or higher.  ? You have life-threatening health problems related to obesity.  Follow these instructions at home:  Eating and drinking    · Follow recommendations from your health care provider about what you eat and drink. Your health care provider may advise you to:  ? Limit fast food, sweets, and processed snack foods.  ? Choose low-fat options, such as low-fat milk instead of whole milk.  ? Eat 5 or more servings of fruits or vegetables every day.  ? Eat at home more often. This gives you more control over what you eat.  ? Choose healthy foods when you eat out.  ? Learn to read food labels. This will help you understand how much food is considered 1 serving.  ? Learn what a healthy serving size is.  ? Keep low-fat snacks available.  ? Limit sugary drinks, such as soda, fruit juice, sweetened iced tea, and flavored  milk.  · Drink enough water to keep your urine pale yellow.  · Do not follow a fad diet. Fad diets can be unhealthy and even dangerous.  Physical activity  · Exercise regularly, as told by your health care provider.  ? Most adults should get up to 150 minutes of moderate-intensity exercise every week.  ? Ask your health care provider what types of exercise are safe for you and how often you should exercise.  · Warm up and stretch before being active.  · Cool down and stretch after being active.  · Rest between periods of activity.  Lifestyle  · Work with your health care provider and a dietitian to set a weight-loss goal that is healthy and reasonable for you.  · Limit your screen time.  · Find ways to reward yourself that do not involve food.  · Do not drink alcohol if:  ? Your health care provider tells you not to drink.  ? You are pregnant, may be pregnant, or are planning to become pregnant.  · If you drink alcohol:  ? Limit how much you use to:  § 0-1 drink a day for women.  § 0-2 drinks a day for men.  ? Be aware of how much alcohol is in your drink. In the [place] one drink equals one 12 oz bottle of beer (355 mL), one 5 oz glass of wine (148 mL), or one 1½ oz glass of hard liquor (44 mL).  General instructions  · Keep a weight-loss journal to keep track of the food you eat and how much exercise you get.  · Take over-the-counter and prescription medicines only as told by your health care provider.  · Take vitamins and supplements only as told by your health care provider.  · Consider joining a support group. Your health care provider may be able to recommend a support group.  · Keep all follow-up visits as told by your health care provider. This is important.  Contact a health care provider if:  · You are unable to meet your weight loss goal after 6 weeks of dietary and lifestyle changes.  Get help right away if you are having:  · Trouble breathing.  · Suicidal thoughts or behaviors.  Summary  · Obesity is the  condition of having too much total body fat.  · Being overweight or obese means that your weight is greater than what is considered healthy for your body size.  · Work with your health care provider and a dietitian to set a weight-loss goal that is healthy and reasonable for you.  · Exercise regularly, as told by your health care provider. Ask your health care provider what types of exercise are safe for you and how often you should exercise.  This information is not intended to replace advice given to you by your health care provider. Make sure you discuss any questions you have with your health care provider.  Document Revised: 08/22/2019 Document Reviewed: 08/22/2019  [employer] Patient Education © 2021 [employer]  MyPlate from [employer]    MyPlate is an outline of a general healthy diet based on the 2010 Dietary Guidelines for Americans, from the [place] Department of Agriculture (USDA). It sets guidelines for how much food you should eat from each food group based on your age, sex, and level of physical activity.  What are tips for following MyPlate?  To follow MyPlate recommendations:  · Eat a wide variety of fruits and vegetables, grains, and protein foods.  · Serve smaller portions and eat less food throughout the day.  · Limit portion sizes to avoid overeating.  · Enjoy your food.  · Get at least 150 minutes of exercise every week. This is about 30 minutes each day, 5 or more days per week.  It can be difficult to have every meal look like MyPlate. Think about MyPlate as eating guidelines for an entire day, rather than each individual meal.  Fruits and vegetables  · Make half of your plate fruits and vegetables.  · Eat many different colors of fruits and vegetables each day.  · For a 2,000 calorie daily food plan, eat:  ? 2½ cups of vegetables every day.  ? 2 cups of fruit every day.  · 1 cup is equal to:  ? 1 cup raw or cooked vegetables.  ? 1 cup raw fruit.  ? 1 medium-sized orange, apple, or banana.  ? 1 cup  100% fruit or vegetable juice.  ? 2 cups raw leafy greens, such as lettuce, spinach, or kale.  ? ½ cup dried fruit.  Grains  · One fourth of your plate should be grains.  · Make at least half of the grains you eat each day whole grains.  · For a 2,000 calorie daily food plan, eat 6 oz of grains every day.  · 1 oz is equal to:  ? 1 slice bread.  ? 1 cup cereal.  ? ½ cup cooked rice, cereal, or pasta.  Protein  · One fourth of your plate should be protein.  · Eat a wide variety of protein foods, including meat, poultry, fish, eggs, beans, nuts, and tofu.  · For a 2,000 calorie daily food plan, eat 5½ oz of protein every day.  · 1 oz is equal to:  ? 1 oz meat, poultry, or fish.  ? ¼ cup cooked beans.  ? 1 egg.  ? ½ oz nuts or seeds.  ? 1 Tbsp peanut butter.  Dairy  · Drink fat-free or low-fat (1%) milk.  · Eat or drink dairy as a side to meals.  · For a 2,000 calorie daily food plan, eat or drink 3 cups of dairy every day.  · 1 cup is equal to:  ? 1 cup milk, yogurt, cottage cheese, or soy milk (soy beverage).  ? 2 oz processed cheese.  ? 1½ oz natural cheese.  Fats, oils, salt, and sugars  · Only small amounts of oils are recommended.  · Avoid foods that are high in calories and low in nutritional value (empty calories), like foods high in fat or added sugars.  · Choose foods that are low in salt (sodium). Choose foods that have less than 140 milligrams (mg) of sodium per serving.  · Drink water instead of sugary drinks. Drink enough water each day to keep your urine pale yellow.  Where to find support  · Work with your health care provider or a nutrition specialist (dietitian) to develop a customized eating plan that is right for you.  · Download an [employer]ancy (mobile application) to help you track your daily food intake.  Where to find more information  · Go to ChooseMyPlate.gov for more information.  Summary  · MyPlate is a general guideline for healthy eating from the USDA. It is based on the 2010 Dietary Guidelines for  Americans.  · In general, fruits and vegetables should take up ½ of your plate, grains should take up ¼ of your plate, and protein should take up ¼ of your plate.  This information is not intended to replace advice given to you by your health care provider. Make sure you discuss any questions you have with your health care provider.  Document Revised: 05/21/2020 Document Reviewed: 03/19/2018  [employer] Patient Education © 2021 [employer]

## 2021-03-24 NOTE — PROGRESS NOTES
Subjective   Follow up, shortness of breath  Kendra Chaney is a 35 y.o. female who presents to day for Follow-up (Here for 6 mo. f/u), Rapid Heart Rate, Palpitations, and Shortness of Breath.    CHIEF COMPLIANT  Chief Complaint   Patient presents with   • Follow-up     Here for 6 mo. f/u   • Rapid Heart Rate   • Palpitations   • Shortness of Breath     Problem list:    1. Chest Pain  2. Shortness of breath  3. Palpitations  4. Mitral Regurg.  5. Old MI  6. [place]  7. COPD  8. BLE edema  10. Lupus  11. Past Drug use  12. Former smoker  13. Echo, 7-, EF 66-70%, mild MR      Problem List Items Addressed This Visit        Cardiac and Vasculature    VHD (valvular heart disease)    Overview     Echo 12/12 EF 60% mild MR, trace TR         Relevant Medications    metoprolol succinate XL (TOPROL-XL) 50 MG 24 hr tablet    Palpitations    Relevant Medications    aspirin 81 MG EC tablet    metoprolol succinate XL (TOPROL-XL) 50 MG 24 hr tablet    potassium citrate (UROCIT-K) 10 MEQ (1080 MG) CR tablet    Other Relevant Orders    Comprehensive Metabolic Panel       Endocrine and Metabolic    Morbid obesity with BMI of 40.0-44.9, adult (CMS/HCC)       Pulmonary and Pneumonias    Shortness of breath - Primary    Relevant Medications    spironolactone (ALDACTONE) 25 MG tablet    bumetanide (BUMEX) 1 MG tablet    metoprolol succinate XL (TOPROL-XL) 50 MG 24 hr tablet       Symptoms and Signs    Fatigue    Bilateral leg edema       Tobacco    Nicotine dependence          HPI  Still with stable shortness of breath on moderate exertion, with stable LE edema, no chest pain, occasional palpitations, no dizziness, still chewing tobacco                    PRIOR MEDS  Current Outpatient Medications on File Prior to Visit   Medication Sig Dispense Refill   • busPIRone (BUSPAR) 15 MG tablet Take 15 mg by mouth 2 (Two) Times a Day.     • cetirizine (zyrTEC) 10 MG tablet Take 10 mg by mouth Daily.     • dicyclomine (BENTYL) 20 MG tablet  Take 20 mg by mouth Every 6 (Six) Hours.     • DULoxetine (CYMBALTA) 60 MG capsule Take 60 mg by mouth Daily.     • famotidine (PEPCID) 20 MG tablet 2 (Two) Times a Day.     • HYDROcodone-acetaminophen (NORCO) 5-325 MG per tablet Take 1 tablet by mouth Every 6 (Six) Hours As Needed.     • hydrOXYzine pamoate (VISTARIL) 50 MG capsule Every Night.     • levocetirizine (XYZAL) 5 MG tablet Daily.     • levothyroxine (SYNTHROID, LEVOTHROID) 50 MCG tablet Daily.     • meloxicam (MOBIC) 7.5 MG tablet Daily.     • methocarbamol (ROBAXIN) 750 MG tablet 3 (Three) Times a Day As Needed.     • mirtazapine (REMERON) 15 MG tablet Every Night.     • montelukast (SINGULAIR) 10 MG tablet Take 10 mg by mouth Daily.     • omeprazole (priLOSEC) 20 MG capsule Take 20 mg by mouth Daily.     • ondansetron (ZOFRAN) 4 MG tablet Take 4 mg by mouth Every 8 (Eight) Hours As Needed for nausea or vomiting.     • vitamin D (ERGOCALCIFEROL) 1.25 MG (45715 UT) capsule capsule 1 (One) Time Per Week.     • [DISCONTINUED] aspirin 81 MG EC tablet Take 81 mg by mouth Daily.     • [DISCONTINUED] bumetanide (BUMEX) 1 MG tablet Take 1 tablet by mouth 2 (Two) Times a Day. 180 tablet 1   • [DISCONTINUED] metoprolol succinate XL (TOPROL-XL) 50 MG 24 hr tablet Take 1 tablet by mouth Daily. 30 tablet 11   • [DISCONTINUED] potassium citrate (UROCIT-K) 10 MEQ (1080 MG) CR tablet Daily.     • [DISCONTINUED] spironolactone (ALDACTONE) 25 MG tablet Take 1 tablet by mouth Daily. 90 tablet 1     No current facility-administered medications on file prior to visit.       ALLERGIES  Ibuprofen, Amoxicillin, Contrast dye, Folic acid, Garlic, Iodinated diagnostic agents, Iron, Penicillins, Phenergan [promethazine hcl], Prenatal complete, Risperidone, and Seroquel [quetiapine]    HISTORY  Past Medical History:   Diagnosis Date   • Asthma    • Chest pain    • COPD (chronic obstructive pulmonary disease) (CMS/HCC)    • Drug use    • Edema    • H/O  section    • History  of appendectomy    • History of ear surgery     right   • Lupus (CMS/HCC)    • Old MI (myocardial infarction)    • Palpitation    • Seizure (CMS/HCC)     Pt states only related to taking ibuprofen   • Shortness of breath        Social History     Socioeconomic History   • Marital status: Unknown     Spouse name: Not on file   • Number of children: Not on file   • Years of education: Not on file   • Highest education level: Not on file   Tobacco Use   • Smoking status: Former Smoker     Types: Cigarettes     Quit date:      Years since quittin.2   • Smokeless tobacco: Current User     Types: Snuff   Substance and Sexual Activity   • Alcohol use: No     Alcohol/week: 7.0 standard drinks     Types: 7 Cans of beer per week   • Drug use: Yes     Types: Marijuana, Methamphetamines     Comment: in past   • Sexual activity: Defer       Family History   Problem Relation Age of Onset   • Heart disease Father    • Hypertension Father    • COPD Father    • Diabetes Father    • Alzheimer's disease Father        Review of Systems   Constitutional: Positive for fatigue. Negative for activity change.   HENT: Negative.  Negative for congestion.    Eyes: Positive for visual disturbance (contact).   Respiratory: Positive for shortness of breath (HX COPD). Negative for apnea, cough, choking, chest tightness and stridor.    Cardiovascular: Positive for palpitations and leg swelling. Negative for chest pain.   Gastrointestinal: Negative.  Negative for abdominal distention and abdominal pain.   Endocrine: Negative.  Negative for cold intolerance and heat intolerance.   Genitourinary: Positive for difficulty urinating.   Musculoskeletal: Positive for arthralgias and myalgias.   Skin: Negative.  Negative for color change and pallor.   Allergic/Immunologic: Positive for environmental allergies.   Neurological: Positive for dizziness, syncope and light-headedness.   Hematological: Bruises/bleeds easily.   Psychiatric/Behavioral:  "Positive for sleep disturbance.       Objective     VITALS: /80 (BP Location: Left arm, Patient Position: Sitting)   Pulse 90   Ht 157.5 cm (62.01\")   Wt 115 kg (253 lb 12.8 oz)   SpO2 99%   BMI 46.41 kg/m²     LABS:   Lab Results (most recent)     None          IMAGING:   No Images in the past 120 days found..    EXAM:  Physical Exam  Vitals reviewed.   Constitutional:       Appearance: She is well-developed.   HENT:      Head: Normocephalic and atraumatic.   Eyes:      Pupils: Pupils are equal, round, and reactive to light.   Neck:      Thyroid: No thyromegaly.      Vascular: No JVD.   Cardiovascular:      Rate and Rhythm: Normal rate and regular rhythm.      Heart sounds: Normal heart sounds. No murmur heard.   No friction rub. No gallop.    Pulmonary:      Effort: Pulmonary effort is normal. No respiratory distress.      Breath sounds: Normal breath sounds. No stridor. No wheezing or rales.   Chest:      Chest wall: No tenderness.   Musculoskeletal:         General: No tenderness or deformity.      Cervical back: Neck supple.   Skin:     General: Skin is warm and dry.   Neurological:      Mental Status: She is alert and oriented to person, place, and time.      Cranial Nerves: No cranial nerve deficit.      Coordination: Coordination normal.         Procedure   Procedures       Assessment/Plan    Diagnosis Plan   1. Shortness of breath  spironolactone (ALDACTONE) 25 MG tablet    bumetanide (BUMEX) 1 MG tablet    metoprolol succinate XL (TOPROL-XL) 50 MG 24 hr tablet   2. Palpitations  aspirin 81 MG EC tablet    metoprolol succinate XL (TOPROL-XL) 50 MG 24 hr tablet    potassium citrate (UROCIT-K) 10 MEQ (1080 MG) CR tablet    Comprehensive Metabolic Panel   3. VHD (valvular heart disease)     4. Bilateral leg edema     5. Chronic fatigue     6. Other tobacco product nicotine dependence with other nicotine-induced disorder     7. Morbid obesity with BMI of 40.0-44.9, adult (CMS/HCC)     1. Gained 11 " pounds, advised to lose weight  2. I reviewed labs, with mild hyponatremia, advised to cut down liquid intake to maximum to 2 litres a day  3. No clinical changes, will continue current management  4. Strongly advised to quit chewing, but she is not willing to do so  5. She is followed by a nephorologist in [place]  5. She has excellent lipid profile, will continue current management    Return in about 3 months (around 6/24/2021).    Diagnoses and all orders for this visit:    1. Shortness of breath (Primary)  -     spironolactone (ALDACTONE) 25 MG tablet; Take 1 tablet by mouth Daily.  Dispense: 90 tablet; Refill: 1  -     bumetanide (BUMEX) 1 MG tablet; Take 1 tablet by mouth 2 (Two) Times a Day.  Dispense: 180 tablet; Refill: 1  -     metoprolol succinate XL (TOPROL-XL) 50 MG 24 hr tablet; Take 1 tablet by mouth Daily.  Dispense: 30 tablet; Refill: 11    2. Palpitations  -     aspirin 81 MG EC tablet; Take 1 tablet by mouth Daily.  Dispense: 90 tablet; Refill: 1  -     metoprolol succinate XL (TOPROL-XL) 50 MG 24 hr tablet; Take 1 tablet by mouth Daily.  Dispense: 30 tablet; Refill: 11  -     potassium citrate (UROCIT-K) 10 MEQ (1080 MG) CR tablet; Take 1 tablet by mouth Daily.  Dispense: 90 tablet; Refill: 1  -     Comprehensive Metabolic Panel; Future    3. VHD (valvular heart disease)    4. Bilateral leg edema    5. Chronic fatigue    6. Other tobacco product nicotine dependence with other nicotine-induced disorder    7. Morbid obesity with BMI of 40.0-44.9, adult (CMS/[facility])        Kendra Chaney  reports that she quit smoking about 13 years ago. Her smoking use included cigarettes. Her smokeless tobacco use includes snuff.. I have educated her on the risk of diseases from using tobacco products such as cancer, COPD and heart disease.          Patient's Body mass index is 46.41 kg/m². BMI is above normal parameters. Recommendations include: educational material and referral to primary care.           MEDS  ORDERED DURING VISIT:  New Medications Ordered This Visit   Medications   • spironolactone (ALDACTONE) 25 MG tablet     Sig: Take 1 tablet by mouth Daily.     Dispense:  90 tablet     Refill:  1   • aspirin 81 MG EC tablet     Sig: Take 1 tablet by mouth Daily.     Dispense:  90 tablet     Refill:  1   • bumetanide (BUMEX) 1 MG tablet     Sig: Take 1 tablet by mouth 2 (Two) Times a Day.     Dispense:  180 tablet     Refill:  1   • metoprolol succinate XL (TOPROL-XL) 50 MG 24 hr tablet     Sig: Take 1 tablet by mouth Daily.     Dispense:  30 tablet     Refill:  11   • potassium citrate (UROCIT-K) 10 MEQ (1080 MG) CR tablet     Sig: Take 1 tablet by mouth Daily.     Dispense:  90 tablet     Refill:  1         This document has been electronically signed by Maribeth Tate MD  March 24, 2021 17:23 EDT

## 2021-07-07 ENCOUNTER — OFFICE VISIT (OUTPATIENT)
Dept: CARDIOLOGY | Facility: CLINIC | Age: 36
End: 2021-07-07

## 2021-07-07 VITALS
HEART RATE: 98 BPM | HEIGHT: 62 IN | OXYGEN SATURATION: 100 % | BODY MASS INDEX: 46.41 KG/M2 | WEIGHT: 252.2 LBS | DIASTOLIC BLOOD PRESSURE: 81 MMHG | SYSTOLIC BLOOD PRESSURE: 124 MMHG

## 2021-07-07 DIAGNOSIS — E78.5 DYSLIPIDEMIA: ICD-10-CM

## 2021-07-07 DIAGNOSIS — R07.2 PRECORDIAL PAIN: ICD-10-CM

## 2021-07-07 DIAGNOSIS — F17.298 OTHER TOBACCO PRODUCT NICOTINE DEPENDENCE WITH OTHER NICOTINE-INDUCED DISORDER: ICD-10-CM

## 2021-07-07 DIAGNOSIS — I38 VHD (VALVULAR HEART DISEASE): ICD-10-CM

## 2021-07-07 DIAGNOSIS — R06.02 SHORTNESS OF BREATH: ICD-10-CM

## 2021-07-07 DIAGNOSIS — R00.2 PALPITATIONS: Primary | ICD-10-CM

## 2021-07-07 DIAGNOSIS — R53.82 CHRONIC FATIGUE: ICD-10-CM

## 2021-07-07 DIAGNOSIS — R00.0 SINUS TACHYCARDIA: ICD-10-CM

## 2021-07-07 DIAGNOSIS — R60.0 BILATERAL LEG EDEMA: ICD-10-CM

## 2021-07-07 PROCEDURE — 99214 OFFICE O/P EST MOD 30 MIN: CPT | Performed by: SPECIALIST

## 2021-07-07 RX ORDER — POTASSIUM CITRATE 10 MEQ/1
10 TABLET, EXTENDED RELEASE ORAL DAILY
Qty: 90 TABLET | Refills: 1 | Status: SHIPPED | OUTPATIENT
Start: 2021-07-07 | End: 2022-08-10 | Stop reason: SDUPTHER

## 2021-07-07 RX ORDER — FUROSEMIDE 40 MG/1
40 TABLET ORAL DAILY
COMMUNITY
End: 2021-07-07 | Stop reason: SDUPTHER

## 2021-07-07 RX ORDER — LORATADINE 10 MG/1
10 TABLET ORAL DAILY
COMMUNITY

## 2021-07-07 RX ORDER — METOPROLOL SUCCINATE 50 MG/1
50 TABLET, EXTENDED RELEASE ORAL DAILY
Qty: 30 TABLET | Refills: 11 | Status: SHIPPED | OUTPATIENT
Start: 2021-07-07 | End: 2022-07-08

## 2021-07-07 RX ORDER — SPIRONOLACTONE 25 MG/1
25 TABLET ORAL DAILY
Qty: 90 TABLET | Refills: 1 | Status: SHIPPED | OUTPATIENT
Start: 2021-07-07 | End: 2022-08-10 | Stop reason: SDUPTHER

## 2021-07-07 RX ORDER — BUSPIRONE HYDROCHLORIDE 7.5 MG/1
TABLET ORAL 2 TIMES DAILY
COMMUNITY
Start: 2021-06-30 | End: 2021-07-07 | Stop reason: DRUGHIGH

## 2021-07-07 RX ORDER — BUMETANIDE 1 MG/1
1 TABLET ORAL 2 TIMES DAILY
Qty: 180 TABLET | Refills: 1 | Status: SHIPPED | OUTPATIENT
Start: 2021-07-07 | End: 2022-08-10 | Stop reason: SDUPTHER

## 2021-07-07 NOTE — PATIENT INSTRUCTIONS
Smokeless Tobacco Information, Adult    Tobacco is a leafy plant that contains a chemical (nicotine). Nicotine affects your brain and causes you to become addicted to it. Smokeless tobacco is tobacco that you put in your mouth instead of smoking it. It may also be called chewing tobacco or snuff.  Smokeless tobacco is made from the leaves of tobacco plants and comes in many forms, such as:  · Loose, dry leaves.  · Plugs or twists.  · Moist pouches.  · Dissolving lozenges or strips.  Chewing, sucking, or holding the tobacco in your mouth causes your mouth to make more saliva. The saliva mixes with the tobacco, which you swallow or spit out. Using tobacco is harmful to your health.  How can smokeless tobacco affect me?  All forms of tobacco contain many chemicals that can harm every organ in the body. Using smokeless tobacco increases your risk for:  · Cancer. Tobacco use is one of the leading causes of cancer. Smokeless tobacco is linked to cancer of the mouth, esophagus, and pancreas.  · Other long-term health problems, including high blood pressure, heart disease, and stroke.  · Addiction.  · Pregnancy complications, if this applies. Pregnant women who use smokeless tobacco are more likely to have a miscarriage or deliver a baby too early.  · Mouth and dental problems, such as:  ? Bad breath.  ? Teeth that look yellow or brown.  ? Mouth sores.  ? Cracking and bleeding lips.  ? Gum recession, gum disease, or tooth decay.  ? Lesions on the soft tissues of your mouth (leukoplakia).  The benefits of not using smokeless tobacco include:  · A healthy mind and body.  · Saving money. You avoid the cost of buying tobacco and the cost of treating illnesses that are caused by tobacco.  What actions can I take to quit using tobacco?  Ask your health care provider for help to quit using smokeless tobacco. This may involve treatment.  These tips may also help you quit:  · Pick a date to quit. Set a date within the next two  weeks. This gives you time to prepare.  · Write down the reasons why you are quitting. Keep this list in places where you will see it often, such as on your bathroom mirror or in your car or wallet.  · Identify the people, places, things, and activities that make you want to use smokeless tobacco (triggers). Avoid them.  · Get rid of any tobacco you have and remove any tobacco smells. To do this:  ? Throw away all containers of tobacco at home, at work, and in your car.  ? Throw away any other items that you use regularly when you chew tobacco.  ? Clean your car and make sure to remove all tobacco-related items.  ? Clean your home, including curtains and carpets.  · Tell your family and friends that you are quitting. Having support can make quitting easier.  · Chew sugarless gum or sunflower seeds when you want to use smokeless tobacco.  · Stay positive. Be prepared for cravings. It is common to slip up when you first quit, so take it one day at a time.  · Stay busy and take care of your body. Get plenty of exercise. Drink enough water to keep your urine pale yellow.  · Keep track of how many days have passed since you quit. Remembering how long and hard you have worked to quit can help you avoid using smokeless tobacco again.  Where to find support  Ask your health care provider if there is a local support group for quitting smokeless tobacco.  Where to find more information  You can learn more about the risks of using smokeless tobacco and the benefits of quitting from these sources:  · American Cancer Society: www.cancer.org  · [employer] [place]: www.cancer.gov  · Centers for Disease Control and Prevention: www.cdc.gov  Contact a health care provider if you have:  · Trouble quitting smokeless tobacco use on your own.  · White or other discolored patches in your mouth.  · Difficulty swallowing.  · A change in your voice.  · Unexplained weight loss.  · Stomach pain, nausea, or  vomiting.  Summary  · Smokeless tobacco contains many different chemicals that are known to cause cancer.  · Nicotine is an addictive chemical in smokeless tobacco that affects your brain.  · The benefits of not using smokeless tobacco include having a healthy mind and body and saving money.  · Tell your family and friends that you are quitting. Having support can make quitting easier.  · Ask your health care provider for help quitting smokeless tobacco. This may involve treatment.  This information is not intended to replace advice given to you by your health care provider. Make sure you discuss any questions you have with your health care provider.  Document Revised: 09/11/2020 Document Reviewed: 02/24/2020  [employer] Patient Education © 2021 [employer]  Obesity, Adult  Obesity is the condition of having too much total body fat. Being overweight or obese means that your weight is greater than what is considered healthy for your body size. Obesity is determined by a measurement called BMI. BMI is an estimate of body fat and is calculated from height and weight. For adults, a BMI of 30 or higher is considered obese.  Obesity can lead to other health concerns and major illnesses, including:  · Stroke.  · Coronary artery disease (CAD).  · Type 2 diabetes.  · Some types of cancer, including cancers of the colon, breast, uterus, and gallbladder.  · Osteoarthritis.  · High blood pressure (hypertension).  · High cholesterol.  · Sleep apnea.  · Gallbladder stones.  · Infertility problems.  What are the causes?  Common causes of this condition include:  · Eating daily meals that are high in calories, sugar, and fat.  · Being born with genes that may make you more likely to become obese.  · Having a medical condition that causes obesity, including:  ? Hypothyroidism.  ? Polycystic ovarian syndrome (PCOS).  ? Binge-eating disorder.  ? Cushing syndrome.  · Taking certain medicines, such as steroids, antidepressants, and  seizure medicines.  · Not being physically active (sedentary lifestyle).  · Not getting enough sleep.  · Drinking high amounts of sugar-sweetened beverages, such as soft drinks.  What increases the risk?  The following factors may make you more likely to develop this condition:  · Having a family history of obesity.  · Being a woman of  descent.  · Being a man of  descent.  · Living in an area with limited access to:  ? Lares, recreation centers, or sidewalks.  ? Healthy food choices, such as grocery stores and farmers' markets.  What are the signs or symptoms?  The main sign of this condition is having too much body fat.  How is this diagnosed?  This condition is diagnosed based on:  · Your BMI. If you are an adult with a BMI of 30 or higher, you are considered obese.  · Your waist circumference. This measures the distance around your waistline.  · Your skinfold thickness. Your health care provider may gently pinch a fold of your skin and measure it.  You may have other tests to check for underlying conditions.  How is this treated?  Treatment for this condition often includes changing your lifestyle. Treatment may include some or all of the following:  · Dietary changes. This may include developing a healthy meal plan.  · Regular physical activity. This may include activity that causes your heart to beat faster (aerobic exercise) and strength training. Work with your health care provider to design an exercise program that works for you.  · Medicine to help you lose weight if you are unable to lose 1 pound a week after 6 weeks of healthy eating and more physical activity.  · Treating conditions that cause the obesity (underlying conditions).  · Surgery. Surgical options may include gastric banding and gastric bypass. Surgery may be done if:  ? Other treatments have not helped to improve your condition.  ? You have a BMI of 40 or higher.  ? You have life-threatening health problems related to  obesity.  Follow these instructions at home:  Eating and drinking    · Follow recommendations from your health care provider about what you eat and drink. Your health care provider may advise you to:  ? Limit fast food, sweets, and processed snack foods.  ? Choose low-fat options, such as low-fat milk instead of whole milk.  ? Eat 5 or more servings of fruits or vegetables every day.  ? Eat at home more often. This gives you more control over what you eat.  ? Choose healthy foods when you eat out.  ? Learn to read food labels. This will help you understand how much food is considered 1 serving.  ? Learn what a healthy serving size is.  ? Keep low-fat snacks available.  ? Limit sugary drinks, such as soda, fruit juice, sweetened iced tea, and flavored milk.  · Drink enough water to keep your urine pale yellow.  · Do not follow a fad diet. Fad diets can be unhealthy and even dangerous.  Physical activity  · Exercise regularly, as told by your health care provider.  ? Most adults should get up to 150 minutes of moderate-intensity exercise every week.  ? Ask your health care provider what types of exercise are safe for you and how often you should exercise.  · Warm up and stretch before being active.  · Cool down and stretch after being active.  · Rest between periods of activity.  Lifestyle  · Work with your health care provider and a dietitian to set a weight-loss goal that is healthy and reasonable for you.  · Limit your screen time.  · Find ways to reward yourself that do not involve food.  · Do not drink alcohol if:  ? Your health care provider tells you not to drink.  ? You are pregnant, may be pregnant, or are planning to become pregnant.  · If you drink alcohol:  ? Limit how much you use to:  § 0-1 drink a day for women.  § 0-2 drinks a day for men.  ? Be aware of how much alcohol is in your drink. In the [place] one drink equals one 12 oz bottle of beer (355 mL), one 5 oz glass of wine (148 mL), or one 1½ oz  glass of hard liquor (44 mL).  General instructions  · Keep a weight-loss journal to keep track of the food you eat and how much exercise you get.  · Take over-the-counter and prescription medicines only as told by your health care provider.  · Take vitamins and supplements only as told by your health care provider.  · Consider joining a support group. Your health care provider may be able to recommend a support group.  · Keep all follow-up visits as told by your health care provider. This is important.  Contact a health care provider if:  · You are unable to meet your weight loss goal after 6 weeks of dietary and lifestyle changes.  Get help right away if you are having:  · Trouble breathing.  · Suicidal thoughts or behaviors.  Summary  · Obesity is the condition of having too much total body fat.  · Being overweight or obese means that your weight is greater than what is considered healthy for your body size.  · Work with your health care provider and a dietitian to set a weight-loss goal that is healthy and reasonable for you.  · Exercise regularly, as told by your health care provider. Ask your health care provider what types of exercise are safe for you and how often you should exercise.  This information is not intended to replace advice given to you by your health care provider. Make sure you discuss any questions you have with your health care provider.  Document Revised: 08/22/2019 Document Reviewed: 08/22/2019  [employer] Patient Education © 2021 [employer]  MyPlate from [employer]    MyPlate is an outline of a general healthy diet based on the 2010 Dietary Guidelines for Americans, from the [place] Department of Agriculture (USDA). It sets guidelines for how much food you should eat from each food group based on your age, sex, and level of physical activity.  What are tips for following MyPlate?  To follow MyPlate recommendations:  · Eat a wide variety of fruits and vegetables, grains, and protein foods.  · Serve  smaller portions and eat less food throughout the day.  · Limit portion sizes to avoid overeating.  · Enjoy your food.  · Get at least 150 minutes of exercise every week. This is about 30 minutes each day, 5 or more days per week.  It can be difficult to have every meal look like MyPlate. Think about MyPlate as eating guidelines for an entire day, rather than each individual meal.  Fruits and vegetables  · Make half of your plate fruits and vegetables.  · Eat many different colors of fruits and vegetables each day.  · For a 2,000 calorie daily food plan, eat:  ? 2½ cups of vegetables every day.  ? 2 cups of fruit every day.  · 1 cup is equal to:  ? 1 cup raw or cooked vegetables.  ? 1 cup raw fruit.  ? 1 medium-sized orange, apple, or banana.  ? 1 cup 100% fruit or vegetable juice.  ? 2 cups raw leafy greens, such as lettuce, spinach, or kale.  ? ½ cup dried fruit.  Grains  · One fourth of your plate should be grains.  · Make at least half of the grains you eat each day whole grains.  · For a 2,000 calorie daily food plan, eat 6 oz of grains every day.  · 1 oz is equal to:  ? 1 slice bread.  ? 1 cup cereal.  ? ½ cup cooked rice, cereal, or pasta.  Protein  · One fourth of your plate should be protein.  · Eat a wide variety of protein foods, including meat, poultry, fish, eggs, beans, nuts, and tofu.  · For a 2,000 calorie daily food plan, eat 5½ oz of protein every day.  · 1 oz is equal to:  ? 1 oz meat, poultry, or fish.  ? ¼ cup cooked beans.  ? 1 egg.  ? ½ oz nuts or seeds.  ? 1 Tbsp peanut butter.  Dairy  · Drink fat-free or low-fat (1%) milk.  · Eat or drink dairy as a side to meals.  · For a 2,000 calorie daily food plan, eat or drink 3 cups of dairy every day.  · 1 cup is equal to:  ? 1 cup milk, yogurt, cottage cheese, or soy milk (soy beverage).  ? 2 oz processed cheese.  ? 1½ oz natural cheese.  Fats, oils, salt, and sugars  · Only small amounts of oils are recommended.  · Avoid foods that are high in  calories and low in nutritional value (empty calories), like foods high in fat or added sugars.  · Choose foods that are low in salt (sodium). Choose foods that have less than 140 milligrams (mg) of sodium per serving.  · Drink water instead of sugary drinks. Drink enough water each day to keep your urine pale yellow.  Where to find support  · Work with your health care provider or a nutrition specialist (dietitian) to develop a customized eating plan that is right for you.  · Download an [employer] (mobile application) to help you track your daily food intake.  Where to find more information  · Go to ChooseMyPlate.gov for more information.  Summary  · MyPlate is a general guideline for healthy eating from the USDA. It is based on the 2010 Dietary Guidelines for Americans.  · In general, fruits and vegetables should take up ½ of your plate, grains should take up ¼ of your plate, and protein should take up ¼ of your plate.  This information is not intended to replace advice given to you by your health care provider. Make sure you discuss any questions you have with your health care provider.  Document Revised: 05/21/2020 Document Reviewed: 03/19/2018  [employer] Patient Education © 2021 [employer]

## 2021-07-07 NOTE — PROGRESS NOTES
Subjective   Follow up, shortness of breath  Kendra Chaney is a 36 y.o. female who presents to day for Follow-up (Here for 3 mo. f/u), Rapid Heart Rate, Palpitations, and Shortness of Breath.    CHIEF COMPLIANT  Chief Complaint   Patient presents with   • Follow-up     Here for 3 mo. f/u   • Rapid Heart Rate   • Palpitations   • Shortness of Breath     Problem list:     1. Chest Pain  2. Shortness of breath  3. Palpitations  4. Mitral Regurg.  5. Old MI  6. Astham  7. COPD  8. BLE edema  10. Lupus  11. Past Drug use  12. Former smoker  13. Echo, 7-, EF 66-70%, mild MR        Problem List Items Addressed This Visit        Cardiac and Vasculature    Sinus tachycardia    VHD (valvular heart disease)    Overview     Echo 12/12 EF 60% mild MR, trace TR         Relevant Medications    metoprolol succinate XL (TOPROL-XL) 50 MG 24 hr tablet    Precordial pain    Palpitations - Primary    Relevant Medications    potassium citrate (UROCIT-K) 10 MEQ (1080 MG) CR tablet    metoprolol succinate XL (TOPROL-XL) 50 MG 24 hr tablet    Dyslipidemia    Relevant Orders    Lipid Panel    Comprehensive Metabolic Panel       Pulmonary and Pneumonias    Shortness of breath    Relevant Medications    spironolactone (ALDACTONE) 25 MG tablet    bumetanide (BUMEX) 1 MG tablet    metoprolol succinate XL (TOPROL-XL) 50 MG 24 hr tablet       Symptoms and Signs    Fatigue    Bilateral leg edema       Tobacco    Nicotine dependence          HPI    She has been having urinary retention now she has to get self-catheterization she is seeing a urologist had some chest pain yesterday was worse taking a deep breath while she is sitting lasted for few minutes now she is also using oxygen which shortness of mild to moderate exertion he was referred to Dr. Beach who referred her for sleep study which is still pending and also lung function testing edema stable, she still has occasional palpitations does not smoke                      PRIOR  MEDS  Current Outpatient Medications on File Prior to Visit   Medication Sig Dispense Refill   • aspirin 81 MG EC tablet Take 1 tablet by mouth Daily. 90 tablet 1   • busPIRone (BUSPAR) 15 MG tablet Take 15 mg by mouth 2 (Two) Times a Day.     • cetirizine (zyrTEC) 10 MG tablet Take 10 mg by mouth Daily.     • dicyclomine (BENTYL) 20 MG tablet Take 20 mg by mouth Every 6 (Six) Hours.     • DULoxetine (CYMBALTA) 60 MG capsule Take 60 mg by mouth Daily.     • famotidine (PEPCID) 20 MG tablet 2 (Two) Times a Day.     • HYDROcodone-acetaminophen (NORCO) 5-325 MG per tablet Take 1 tablet by mouth Every 6 (Six) Hours As Needed.     • hydrOXYzine pamoate (VISTARIL) 50 MG capsule Every Night.     • levocetirizine (XYZAL) 5 MG tablet Daily.     • levothyroxine (SYNTHROID, LEVOTHROID) 50 MCG tablet Daily.     • loratadine (CLARITIN) 10 MG tablet Take 10 mg by mouth Daily.     • meloxicam (MOBIC) 7.5 MG tablet Daily.     • methocarbamol (ROBAXIN) 750 MG tablet 3 (Three) Times a Day As Needed.     • mirtazapine (REMERON) 15 MG tablet Every Night.     • montelukast (SINGULAIR) 10 MG tablet Take 10 mg by mouth Daily.     • O2 (OXYGEN) Inhale 2 L/min 1 (One) Time. prn     • omeprazole (priLOSEC) 20 MG capsule Take 20 mg by mouth Daily.     • ondansetron (ZOFRAN) 4 MG tablet Take 4 mg by mouth Every 8 (Eight) Hours As Needed for nausea or vomiting.     • vitamin D (ERGOCALCIFEROL) 1.25 MG (09893 UT) capsule capsule 1 (One) Time Per Week.     • [DISCONTINUED] bumetanide (BUMEX) 1 MG tablet Take 1 tablet by mouth 2 (Two) Times a Day. 180 tablet 1   • [DISCONTINUED] furosemide (LASIX) 40 MG tablet Take 40 mg by mouth Daily.     • [DISCONTINUED] metoprolol succinate XL (TOPROL-XL) 50 MG 24 hr tablet Take 1 tablet by mouth Daily. 30 tablet 11   • [DISCONTINUED] potassium citrate (UROCIT-K) 10 MEQ (1080 MG) CR tablet Take 1 tablet by mouth Daily. 90 tablet 1   • [DISCONTINUED] spironolactone (ALDACTONE) 25 MG tablet Take 1 tablet by  mouth Daily. 90 tablet 1   • [DISCONTINUED] busPIRone (BUSPAR) 7.5 MG tablet 2 (two) times a day.       No current facility-administered medications on file prior to visit.       ALLERGIES  Ibuprofen, Amoxicillin, Contrast dye, Folic acid, Garlic, Iodinated diagnostic agents, Iron, Penicillins, Phenergan [promethazine hcl], Prenatal complete, Risperidone, and Seroquel [quetiapine]    HISTORY  Past Medical History:   Diagnosis Date   • Asthma    • Chest pain    • COPD (chronic obstructive pulmonary disease) (CMS/HCC)    • Drug use    • Edema    • H/O  section    • History of appendectomy    • History of ear surgery     right   • Lupus (CMS/HCC)    • Old MI (myocardial infarction)    • Palpitation    • Seizure (CMS/[facility])     Pt states only related to taking ibuprofen   • Shortness of breath        Social History     Socioeconomic History   • Marital status: Unknown     Spouse name: Not on file   • Number of children: Not on file   • Years of education: Not on file   • Highest education level: Not on file   Tobacco Use   • Smoking status: Former Smoker     Types: Cigarettes     Quit date:      Years since quittin.5   • Smokeless tobacco: Current User     Types: Snuff   Substance and Sexual Activity   • Alcohol use: No     Alcohol/week: 7.0 standard drinks     Types: 7 Cans of beer per week   • Drug use: Yes     Types: Marijuana, Methamphetamines     Comment: in past   • Sexual activity: Defer       Family History   Problem Relation Age of Onset   • Heart disease Father    • Hypertension Father    • COPD Father    • Diabetes Father    • Alzheimer's disease Father        Review of Systems   Constitutional: Positive for fatigue.   HENT: Positive for hearing loss.    Eyes: Negative.    Respiratory: Positive for shortness of breath.    Cardiovascular: Positive for chest pain, palpitations and leg swelling.   Gastrointestinal: Positive for constipation.   Endocrine: Negative.    Genitourinary: Positive for  "difficulty urinating.        Self cath's 5 x daily   Musculoskeletal: Negative.    Skin: Negative.    Allergic/Immunologic: Positive for environmental allergies.   Neurological: Positive for light-headedness. Negative for syncope.   Hematological: Bruises/bleeds easily.   Psychiatric/Behavioral: Positive for sleep disturbance.       Objective     VITALS: /81 (BP Location: Left arm, Patient Position: Sitting)   Pulse 98   Ht 157.5 cm (62.01\")   Wt 114 kg (252 lb 3.2 oz)   SpO2 100%   BMI 46.12 kg/m²     LABS:   Lab Results (most recent)     None          IMAGING:   No Images in the past 120 days found..    EXAM:  Physical Exam  Vitals reviewed.   Constitutional:       Appearance: She is well-developed.   HENT:      Head: Normocephalic and atraumatic.   Eyes:      Pupils: Pupils are equal, round, and reactive to light.   Neck:      Thyroid: No thyromegaly.      Vascular: No JVD.   Cardiovascular:      Rate and Rhythm: Normal rate and regular rhythm.      Heart sounds: Normal heart sounds. No murmur heard.   No friction rub. No gallop.       Comments: Trace edema  Pulmonary:      Effort: Pulmonary effort is normal. No respiratory distress.      Breath sounds: Normal breath sounds. No stridor. No wheezing or rales.   Chest:      Chest wall: No tenderness.   Musculoskeletal:         General: No tenderness or deformity.      Cervical back: Neck supple.   Skin:     General: Skin is warm and dry.   Neurological:      Mental Status: She is alert and oriented to person, place, and time.      Cranial Nerves: No cranial nerve deficit.      Coordination: Coordination normal.         Procedure   Procedures       Assessment/Plan     Diagnoses and all orders for this visit:    1. Palpitations (Primary)  -     potassium citrate (UROCIT-K) 10 MEQ (1080 MG) CR tablet; Take 1 tablet by mouth Daily.  Dispense: 90 tablet; Refill: 1  -     metoprolol succinate XL (TOPROL-XL) 50 MG 24 hr tablet; Take 1 tablet by mouth Daily.  " Dispense: 30 tablet; Refill: 11    2. Precordial pain    3. Sinus tachycardia    4. VHD (valvular heart disease)    5. Shortness of breath  -     spironolactone (ALDACTONE) 25 MG tablet; Take 1 tablet by mouth Daily.  Dispense: 90 tablet; Refill: 1  -     bumetanide (BUMEX) 1 MG tablet; Take 1 tablet by mouth 2 (Two) Times a Day.  Dispense: 180 tablet; Refill: 1  -     metoprolol succinate XL (TOPROL-XL) 50 MG 24 hr tablet; Take 1 tablet by mouth Daily.  Dispense: 30 tablet; Refill: 11    6. Bilateral leg edema    7. Chronic fatigue    8. Other tobacco product nicotine dependence with other nicotine-induced disorder    9. Dyslipidemia  -     Lipid Panel; Future  -     Comprehensive Metabolic Panel; Future    1.  Edema stable mostly adipose tissue we will continue the current management she has been taking Bumex and furosemide will discontinue furosemide  2.  Her blood pressure is controlled  3.  She had a pleuritic chest pain she does have a history of lupus according to her and this seems to be pleuritic  4.  She is having urinary retention currently she is being seen by urologist because of urinary retention at this point is not clear she is doing self-catheterization  5.  I have reviewed the labs with slight elevated LDL of 111 otherwise unremarkable she has low vitamin D her primary care physicians given her prescription  6. present now she is using his oxygen as she has been referred to Dr. Beach who is apparently doing lung function studies as well as sleep apnea study  7.  Palpitations stable continue current management  Return in about 6 months (around 1/7/2022).                MEDS ORDERED DURING VISIT:  New Medications Ordered This Visit   Medications   • spironolactone (ALDACTONE) 25 MG tablet     Sig: Take 1 tablet by mouth Daily.     Dispense:  90 tablet     Refill:  1   • bumetanide (BUMEX) 1 MG tablet     Sig: Take 1 tablet by mouth 2 (Two) Times a Day.     Dispense:  180 tablet     Refill:  1    • potassium citrate (UROCIT-K) 10 MEQ (1080 MG) CR tablet     Sig: Take 1 tablet by mouth Daily.     Dispense:  90 tablet     Refill:  1   • metoprolol succinate XL (TOPROL-XL) 50 MG 24 hr tablet     Sig: Take 1 tablet by mouth Daily.     Dispense:  30 tablet     Refill:  11       As always, Eileen Hall APRN  I appreciate very much the opportunity to participate in the cardiovascular care of your patients. Please do not hesitate to call me with any questions with regards to Kendra Chaney evaluation and management.         This document has been electronically signed by Maribeth Ttae MD  July 7, 2021 16:42 EDT

## 2021-10-08 DIAGNOSIS — R00.2 PALPITATIONS: ICD-10-CM

## 2021-10-08 RX ORDER — ASPIRIN 81 MG/1
TABLET ORAL
Qty: 90 TABLET | Refills: 1 | Status: SHIPPED | OUTPATIENT
Start: 2021-10-08

## 2022-07-08 DIAGNOSIS — R00.2 PALPITATIONS: ICD-10-CM

## 2022-07-08 DIAGNOSIS — R06.02 SHORTNESS OF BREATH: ICD-10-CM

## 2022-07-08 RX ORDER — METOPROLOL SUCCINATE 50 MG/1
50 TABLET, EXTENDED RELEASE ORAL DAILY
Qty: 30 TABLET | Refills: 1 | Status: SHIPPED | OUTPATIENT
Start: 2022-07-08 | End: 2022-08-10 | Stop reason: SDUPTHER

## 2022-08-10 ENCOUNTER — OFFICE VISIT (OUTPATIENT)
Dept: CARDIOLOGY | Facility: CLINIC | Age: 37
End: 2022-08-10

## 2022-08-10 VITALS
SYSTOLIC BLOOD PRESSURE: 131 MMHG | HEART RATE: 89 BPM | HEIGHT: 62 IN | OXYGEN SATURATION: 98 % | BODY MASS INDEX: 45.3 KG/M2 | WEIGHT: 246.2 LBS | DIASTOLIC BLOOD PRESSURE: 82 MMHG

## 2022-08-10 DIAGNOSIS — R00.0 SINUS TACHYCARDIA: Primary | ICD-10-CM

## 2022-08-10 DIAGNOSIS — R53.82 CHRONIC FATIGUE: ICD-10-CM

## 2022-08-10 DIAGNOSIS — R60.0 BILATERAL LEG EDEMA: ICD-10-CM

## 2022-08-10 DIAGNOSIS — I38 VHD (VALVULAR HEART DISEASE): ICD-10-CM

## 2022-08-10 DIAGNOSIS — R00.2 PALPITATIONS: ICD-10-CM

## 2022-08-10 DIAGNOSIS — F17.298 OTHER TOBACCO PRODUCT NICOTINE DEPENDENCE WITH OTHER NICOTINE-INDUCED DISORDER: ICD-10-CM

## 2022-08-10 DIAGNOSIS — R06.02 SHORTNESS OF BREATH: ICD-10-CM

## 2022-08-10 DIAGNOSIS — E78.5 DYSLIPIDEMIA: ICD-10-CM

## 2022-08-10 PROCEDURE — 99214 OFFICE O/P EST MOD 30 MIN: CPT | Performed by: SPECIALIST

## 2022-08-10 PROCEDURE — 93000 ELECTROCARDIOGRAM COMPLETE: CPT | Performed by: SPECIALIST

## 2022-08-10 RX ORDER — GABAPENTIN 300 MG/1
CAPSULE ORAL 3 TIMES DAILY
COMMUNITY
Start: 2022-05-10

## 2022-08-10 RX ORDER — SPIRONOLACTONE 25 MG/1
25 TABLET ORAL DAILY
Qty: 90 TABLET | Refills: 1 | Status: SHIPPED | OUTPATIENT
Start: 2022-08-10

## 2022-08-10 RX ORDER — BUMETANIDE 1 MG/1
1 TABLET ORAL 2 TIMES DAILY
Qty: 180 TABLET | Refills: 1 | Status: SHIPPED | OUTPATIENT
Start: 2022-08-10

## 2022-08-10 RX ORDER — BUSPIRONE HYDROCHLORIDE 7.5 MG/1
TABLET ORAL 2 TIMES DAILY
COMMUNITY
Start: 2022-05-16

## 2022-08-10 RX ORDER — POTASSIUM CITRATE 10 MEQ/1
10 TABLET, EXTENDED RELEASE ORAL DAILY
Qty: 90 TABLET | Refills: 1 | Status: SHIPPED | OUTPATIENT
Start: 2022-08-10

## 2022-08-10 RX ORDER — LEVOTHYROXINE SODIUM 88 UG/1
TABLET ORAL DAILY
COMMUNITY
Start: 2022-03-01

## 2022-08-10 RX ORDER — ROPINIROLE 2 MG/1
TABLET, FILM COATED ORAL DAILY
COMMUNITY
Start: 2022-05-16

## 2022-08-10 RX ORDER — METOPROLOL SUCCINATE 50 MG/1
50 TABLET, EXTENDED RELEASE ORAL DAILY
Qty: 30 TABLET | Refills: 1 | Status: SHIPPED | OUTPATIENT
Start: 2022-08-10

## 2022-08-10 NOTE — PROGRESS NOTES
Subjective   Follow up, palpitations  Kendra ISRAEL is a 37 y.o. female who presents to day for Follow-up (Here for 6 mo. F/u), Hyperlipidemia, Palpitations, Rapid Heart Rate, and Shortness of Breath.    CHIEF COMPLIANT  Chief Complaint   Patient presents with   • Follow-up     Here for 6 mo. F/u   • Hyperlipidemia   • Palpitations   • Rapid Heart Rate   • Shortness of Breath     Problem list:     1. Chest Pain  2. Shortness of breath  3. Palpitations  4. Mitral Regurg.  5. Old MI  6. Astham  7. COPD  8. BLE edema  10. Lupus  11. Past Drug use  12. Former smoker  13. Echo, 7-, EF 66-70%, mild MR    Problem List Items Addressed This Visit        Cardiac and Vasculature    Sinus tachycardia - Primary    VHD (valvular heart disease)    Overview     Echo 12/12 EF 60% mild MR, trace TR         Relevant Medications    metoprolol succinate XL (TOPROL-XL) 50 MG 24 hr tablet    Palpitations    Relevant Medications    metoprolol succinate XL (TOPROL-XL) 50 MG 24 hr tablet    potassium citrate (UROCIT-K) 10 MEQ (1080 MG) CR tablet    Other Relevant Orders    ECG 12 Lead    Cardiac Event Monitor    Dyslipidemia    Relevant Orders    Lipid Panel    Comprehensive Metabolic Panel       Pulmonary and Pneumonias    Shortness of breath    Relevant Medications    bumetanide (BUMEX) 1 MG tablet    metoprolol succinate XL (TOPROL-XL) 50 MG 24 hr tablet    spironolactone (ALDACTONE) 25 MG tablet       Symptoms and Signs    Fatigue    Bilateral leg edema       Tobacco    Nicotine dependence          HPI    Heart has been racing for few months all all of the time about 100 she has COPD with significant shortness of breath now she is on oxygen oxygen at home which is stable edema feels tired fatigued all the time                PRIOR MEDS  Current Outpatient Medications on File Prior to Visit   Medication Sig Dispense Refill   • Aspirin Adult Low Strength 81 MG EC tablet TAKE 1 TABLET BY MOUTH DAILY. 90 tablet 1   • busPIRone  (BUSPAR) 7.5 MG tablet 2 (Two) Times a Day.     • cetirizine (zyrTEC) 10 MG tablet Take 10 mg by mouth Daily.     • dicyclomine (BENTYL) 20 MG tablet Take 20 mg by mouth Every 6 (Six) Hours.     • DULoxetine (CYMBALTA) 60 MG capsule Take 60 mg by mouth Daily.     • famotidine (PEPCID) 20 MG tablet 2 (Two) Times a Day.     • gabapentin (NEURONTIN) 300 MG capsule 3 (Three) Times a Day.     • HYDROcodone-acetaminophen (NORCO) 5-325 MG per tablet Take 1 tablet by mouth Every 6 (Six) Hours As Needed.     • hydrOXYzine pamoate (VISTARIL) 50 MG capsule Every Night.     • levocetirizine (XYZAL) 5 MG tablet Daily.     • levothyroxine (SYNTHROID, LEVOTHROID) 88 MCG tablet Daily.     • loratadine (CLARITIN) 10 MG tablet Take 10 mg by mouth Daily.     • meloxicam (MOBIC) 7.5 MG tablet Daily.     • methocarbamol (ROBAXIN) 750 MG tablet 3 (Three) Times a Day As Needed.     • mirtazapine (REMERON) 15 MG tablet Every Night.     • montelukast (SINGULAIR) 10 MG tablet Take 10 mg by mouth Daily.     • O2 (OXYGEN) Inhale 2 L/min 1 (One) Time. prn     • omeprazole (priLOSEC) 20 MG capsule Take 20 mg by mouth Daily.     • ondansetron (ZOFRAN) 4 MG tablet Take 4 mg by mouth Every 8 (Eight) Hours As Needed for nausea or vomiting.     • rOPINIRole (REQUIP) 2 MG tablet Daily.     • vitamin D (ERGOCALCIFEROL) 1.25 MG (42253 UT) capsule capsule 1 (One) Time Per Week.     • [DISCONTINUED] bumetanide (BUMEX) 1 MG tablet Take 1 tablet by mouth 2 (Two) Times a Day. 180 tablet 1   • [DISCONTINUED] metoprolol succinate XL (TOPROL-XL) 50 MG 24 hr tablet TAKE 1 TABLET BY MOUTH DAILY. 30 tablet 1   • [DISCONTINUED] potassium citrate (UROCIT-K) 10 MEQ (1080 MG) CR tablet Take 1 tablet by mouth Daily. 90 tablet 1   • [DISCONTINUED] spironolactone (ALDACTONE) 25 MG tablet Take 1 tablet by mouth Daily. 90 tablet 1   • [DISCONTINUED] busPIRone (BUSPAR) 15 MG tablet Take 15 mg by mouth 2 (Two) Times a Day.     • [DISCONTINUED] levothyroxine (SYNTHROID,  LEVOTHROID) 50 MCG tablet Daily.       No current facility-administered medications on file prior to visit.       ALLERGIES  Ibuprofen, Amoxicillin, Contrast dye, Folic acid, Garlic, Iodinated diagnostic agents, Iron, Penicillins, Phenergan [promethazine hcl], Prenatal complete, Risperidone, and Seroquel [quetiapine]    HISTORY  Past Medical History:   Diagnosis Date   • Asthma    • Chest pain    • COPD (chronic obstructive pulmonary disease) (HCC)    • Drug use    • Edema    • H/O  section    • History of appendectomy    • History of ear surgery     right   • Lupus (HCC)    • Old MI (myocardial infarction)    • Palpitation    • Seizure (HCC)     Pt states only related to taking ibuprofen   • Shortness of breath        Social History     Socioeconomic History   • Marital status:    Tobacco Use   • Smoking status: Former Smoker     Types: Cigarettes     Quit date:      Years since quittin.6   • Smokeless tobacco: Current User     Types: Snuff   Substance and Sexual Activity   • Alcohol use: No     Alcohol/week: 7.0 standard drinks     Types: 7 Cans of beer per week   • Drug use: Yes     Types: Marijuana, Methamphetamines     Comment: in past   • Sexual activity: Defer       Family History   Problem Relation Age of Onset   • Heart disease Father    • Hypertension Father    • COPD Father    • Diabetes Father    • Alzheimer's disease Father        Review of Systems   Constitutional: Positive for fatigue.   HENT: Negative.    Eyes: Positive for visual disturbance.   Respiratory: Positive for shortness of breath. Negative for apnea, cough, choking, chest tightness, wheezing and stridor.    Cardiovascular: Positive for palpitations and leg swelling. Negative for chest pain (off and on).   Gastrointestinal: Positive for constipation.   Endocrine: Negative.    Genitourinary: Negative.         Self cath's   Musculoskeletal: Negative.    Skin: Negative.    Allergic/Immunologic: Positive for  "environmental allergies.   Neurological: Positive for light-headedness. Negative for syncope.   Hematological: Bruises/bleeds easily.   Psychiatric/Behavioral: Positive for sleep disturbance.       Objective     VITALS: /82 (BP Location: Left arm, Patient Position: Sitting)   Pulse 89   Ht 157.5 cm (62\")   Wt 112 kg (246 lb 3.2 oz)   SpO2 98%   BMI 45.03 kg/m²     LABS:   Lab Results (most recent)     None          IMAGING:   No Images in the past 120 days found..    EXAM:  Physical Exam  Vitals reviewed.   Constitutional:       Appearance: She is well-developed.   HENT:      Head: Normocephalic and atraumatic.   Eyes:      Pupils: Pupils are equal, round, and reactive to light.   Neck:      Thyroid: No thyromegaly.      Vascular: No JVD.   Cardiovascular:      Rate and Rhythm: Regular rhythm. Tachycardia present.      Heart sounds: Normal heart sounds. No murmur heard.    No friction rub. No gallop.      Comments: Non pitting edema  Pulmonary:      Effort: Pulmonary effort is normal. No respiratory distress.      Breath sounds: Normal breath sounds. No stridor. No wheezing or rales.   Chest:      Chest wall: No tenderness.   Musculoskeletal:         General: No tenderness or deformity.      Cervical back: Neck supple.   Skin:     General: Skin is warm and dry.   Neurological:      Mental Status: She is alert and oriented to person, place, and time.      Cranial Nerves: No cranial nerve deficit.      Coordination: Coordination normal.         Procedure     ECG 12 Lead    Date/Time: 8/10/2022 3:59 PM  Performed by: Maribeth Tate MD  Authorized by: Maribeth Tate MD   Comparison: not compared with previous ECG           EKG: Normal sinus rhythm otherwise with RSR pattern with otherwise unremarkable EKG no previous EKG available for comparison       Assessment & Plan     Diagnoses and all orders for this visit:    1. Sinus tachycardia (Primary)    2. Dyslipidemia  -     Lipid Panel; Future  -     " Comprehensive Metabolic Panel; Future    3. Palpitations  -     ECG 12 Lead; Future  -     metoprolol succinate XL (TOPROL-XL) 50 MG 24 hr tablet; Take 1 tablet by mouth Daily.  Dispense: 30 tablet; Refill: 1  -     potassium citrate (UROCIT-K) 10 MEQ (1080 MG) CR tablet; Take 1 tablet by mouth Daily.  Dispense: 90 tablet; Refill: 1  -     Cardiac Event Monitor; Future    4. VHD (valvular heart disease)    5. Shortness of breath  -     bumetanide (BUMEX) 1 MG tablet; Take 1 tablet by mouth 2 (Two) Times a Day.  Dispense: 180 tablet; Refill: 1  -     metoprolol succinate XL (TOPROL-XL) 50 MG 24 hr tablet; Take 1 tablet by mouth Daily.  Dispense: 30 tablet; Refill: 1  -     spironolactone (ALDACTONE) 25 MG tablet; Take 1 tablet by mouth Daily.  Dispense: 90 tablet; Refill: 1    6. Bilateral leg edema    7. Chronic fatigue    8. Other tobacco product nicotine dependence with other nicotine-induced disorder    Other orders  -     ECG 12 Lead    1.  Patient is complaining of tachycardia continuously but on the exam today and the EKG looks normal with normal heart rate we will get an event monitor for 1 week for assessment  2.  I do not have any recent blood work I will try to get labs prior to her next visit  3.  She has been diagnosed with COPD now she is on oxygen she said Dr. Beach is monitoring  4.  still chewing tobacco advised her strongly to quit but she is not willing to do that at this point    Return in about 3 months (around 11/10/2022).                 MEDS ORDERED DURING VISIT:  New Medications Ordered This Visit   Medications   • bumetanide (BUMEX) 1 MG tablet     Sig: Take 1 tablet by mouth 2 (Two) Times a Day.     Dispense:  180 tablet     Refill:  1   • metoprolol succinate XL (TOPROL-XL) 50 MG 24 hr tablet     Sig: Take 1 tablet by mouth Daily.     Dispense:  30 tablet     Refill:  1   • potassium citrate (UROCIT-K) 10 MEQ (1080 MG) CR tablet     Sig: Take 1 tablet by mouth Daily.     Dispense:   90 tablet     Refill:  1   • spironolactone (ALDACTONE) 25 MG tablet     Sig: Take 1 tablet by mouth Daily.     Dispense:  90 tablet     Refill:  1       As always, Eileen Hall APRN  I appreciate very much the opportunity to participate in the cardiovascular care of your patients. Please do not hesitate to call me with any questions with regards to Kendra ISRAEL evaluation and management.         This document has been electronically signed by Maribeth aTte MD  August 10, 2022 16:03 EDT

## 2023-02-08 ENCOUNTER — TELEPHONE (OUTPATIENT)
Dept: CARDIOLOGY | Facility: CLINIC | Age: 38
End: 2023-02-08
Payer: COMMERCIAL

## 2023-02-08 NOTE — TELEPHONE ENCOUNTER
Caller: SHRUTHI    Relationship: SELF    Best call back number: [phone]    What is the best time to reach you: AFTERNOONS    Who are you requesting to speak with (clinical staff, provider,  specific staff member): ANY    What was the call regarding: PATIENT CALLED IN REQUESTING TO RESCHEDULE APPT DUE TO PINCHED NERVE IN BACK. FIRST AVAIL IS June 2023. PLEASE ADVISE IF THIS APPT IS OK. PATIENT NOT SCHEDULED FOR IT. THANK YOU    Do you require a callback: YES

## 2023-02-08 NOTE — TELEPHONE ENCOUNTER
A pinched nerve is not cardiac, so it's not something we would see pt for. She needs to see PCP.       I called pt, she stated that appt is because she missed her last appt due to a pinched nerve. I scheduled her for next available. Pt c/o extreme pain x1 month due to nerve, I advised her to go to the ER to see if anything can be done to help her. She verbalized understanding.

## 2024-07-10 LAB
MAXIMAL PREDICTED HEART RATE: 189 BPM
STRESS TARGET HR: 161 BPM